# Patient Record
Sex: FEMALE | Race: WHITE | NOT HISPANIC OR LATINO
[De-identification: names, ages, dates, MRNs, and addresses within clinical notes are randomized per-mention and may not be internally consistent; named-entity substitution may affect disease eponyms.]

---

## 2017-01-27 ENCOUNTER — APPOINTMENT (OUTPATIENT)
Dept: OBGYN | Facility: CLINIC | Age: 47
End: 2017-01-27

## 2017-01-31 ENCOUNTER — APPOINTMENT (OUTPATIENT)
Dept: ULTRASOUND IMAGING | Facility: CLINIC | Age: 47
End: 2017-01-31

## 2017-06-23 ENCOUNTER — APPOINTMENT (OUTPATIENT)
Dept: OBGYN | Facility: CLINIC | Age: 47
End: 2017-06-23

## 2017-09-11 ENCOUNTER — APPOINTMENT (OUTPATIENT)
Dept: OBGYN | Facility: CLINIC | Age: 47
End: 2017-09-11

## 2017-09-13 ENCOUNTER — RESULT REVIEW (OUTPATIENT)
Age: 47
End: 2017-09-13

## 2017-09-14 ENCOUNTER — APPOINTMENT (OUTPATIENT)
Dept: OBGYN | Facility: CLINIC | Age: 47
End: 2017-09-14
Payer: COMMERCIAL

## 2017-09-14 PROCEDURE — 36415 COLL VENOUS BLD VENIPUNCTURE: CPT

## 2017-09-14 PROCEDURE — 99396 PREV VISIT EST AGE 40-64: CPT

## 2018-12-09 ENCOUNTER — RESULT REVIEW (OUTPATIENT)
Age: 48
End: 2018-12-09

## 2018-12-10 ENCOUNTER — APPOINTMENT (OUTPATIENT)
Dept: OBGYN | Facility: CLINIC | Age: 48
End: 2018-12-10
Payer: COMMERCIAL

## 2018-12-10 PROCEDURE — 99396 PREV VISIT EST AGE 40-64: CPT

## 2019-09-16 ENCOUNTER — APPOINTMENT (OUTPATIENT)
Dept: OBGYN | Facility: CLINIC | Age: 49
End: 2019-09-16
Payer: COMMERCIAL

## 2019-09-16 PROCEDURE — 99213 OFFICE O/P EST LOW 20 MIN: CPT

## 2019-12-15 ENCOUNTER — FORM ENCOUNTER (OUTPATIENT)
Age: 49
End: 2019-12-15

## 2019-12-16 ENCOUNTER — APPOINTMENT (OUTPATIENT)
Dept: OBGYN | Facility: CLINIC | Age: 49
End: 2019-12-16
Payer: COMMERCIAL

## 2019-12-16 ENCOUNTER — RESULT REVIEW (OUTPATIENT)
Age: 49
End: 2019-12-16

## 2019-12-16 PROCEDURE — 99396 PREV VISIT EST AGE 40-64: CPT

## 2019-12-31 ENCOUNTER — APPOINTMENT (OUTPATIENT)
Dept: MAMMOGRAPHY | Facility: IMAGING CENTER | Age: 49
End: 2019-12-31
Payer: COMMERCIAL

## 2019-12-31 ENCOUNTER — OUTPATIENT (OUTPATIENT)
Dept: OUTPATIENT SERVICES | Facility: HOSPITAL | Age: 49
LOS: 1 days | End: 2019-12-31
Payer: COMMERCIAL

## 2019-12-31 ENCOUNTER — APPOINTMENT (OUTPATIENT)
Dept: ULTRASOUND IMAGING | Facility: IMAGING CENTER | Age: 49
End: 2019-12-31
Payer: COMMERCIAL

## 2019-12-31 DIAGNOSIS — Z00.8 ENCOUNTER FOR OTHER GENERAL EXAMINATION: ICD-10-CM

## 2019-12-31 PROCEDURE — 77063 BREAST TOMOSYNTHESIS BI: CPT

## 2019-12-31 PROCEDURE — 77067 SCR MAMMO BI INCL CAD: CPT | Mod: 26

## 2019-12-31 PROCEDURE — 76641 ULTRASOUND BREAST COMPLETE: CPT | Mod: 26,50

## 2019-12-31 PROCEDURE — 77067 SCR MAMMO BI INCL CAD: CPT

## 2019-12-31 PROCEDURE — 76641 ULTRASOUND BREAST COMPLETE: CPT

## 2019-12-31 PROCEDURE — 77063 BREAST TOMOSYNTHESIS BI: CPT | Mod: 26

## 2020-12-10 ENCOUNTER — RESULT REVIEW (OUTPATIENT)
Age: 50
End: 2020-12-10

## 2020-12-10 ENCOUNTER — APPOINTMENT (OUTPATIENT)
Dept: OBGYN | Facility: CLINIC | Age: 50
End: 2020-12-10
Payer: COMMERCIAL

## 2020-12-10 PROCEDURE — 36415 COLL VENOUS BLD VENIPUNCTURE: CPT

## 2020-12-10 PROCEDURE — 99072 ADDL SUPL MATRL&STAF TM PHE: CPT

## 2020-12-10 PROCEDURE — 58100 BIOPSY OF UTERUS LINING: CPT

## 2020-12-31 ENCOUNTER — FORM ENCOUNTER (OUTPATIENT)
Age: 50
End: 2020-12-31

## 2021-01-06 ENCOUNTER — APPOINTMENT (OUTPATIENT)
Dept: OBGYN | Facility: CLINIC | Age: 51
End: 2021-01-06

## 2021-01-15 ENCOUNTER — APPOINTMENT (OUTPATIENT)
Dept: MAMMOGRAPHY | Facility: IMAGING CENTER | Age: 51
End: 2021-01-15

## 2021-01-18 ENCOUNTER — APPOINTMENT (OUTPATIENT)
Dept: MAMMOGRAPHY | Facility: IMAGING CENTER | Age: 51
End: 2021-01-18
Payer: COMMERCIAL

## 2021-01-18 ENCOUNTER — RESULT REVIEW (OUTPATIENT)
Age: 51
End: 2021-01-18

## 2021-01-18 ENCOUNTER — OUTPATIENT (OUTPATIENT)
Dept: OUTPATIENT SERVICES | Facility: HOSPITAL | Age: 51
LOS: 1 days | End: 2021-01-18
Payer: COMMERCIAL

## 2021-01-18 DIAGNOSIS — Z12.39 ENCOUNTER FOR OTHER SCREENING FOR MALIGNANT NEOPLASM OF BREAST: ICD-10-CM

## 2021-01-18 DIAGNOSIS — Z00.8 ENCOUNTER FOR OTHER GENERAL EXAMINATION: ICD-10-CM

## 2021-01-18 PROCEDURE — 77067 SCR MAMMO BI INCL CAD: CPT | Mod: 26

## 2021-01-18 PROCEDURE — 77063 BREAST TOMOSYNTHESIS BI: CPT | Mod: 26

## 2021-01-18 PROCEDURE — 77063 BREAST TOMOSYNTHESIS BI: CPT

## 2021-01-18 PROCEDURE — 77067 SCR MAMMO BI INCL CAD: CPT

## 2021-01-19 ENCOUNTER — NON-APPOINTMENT (OUTPATIENT)
Age: 51
End: 2021-01-19

## 2021-01-19 ENCOUNTER — APPOINTMENT (OUTPATIENT)
Dept: CARDIOLOGY | Facility: CLINIC | Age: 51
End: 2021-01-19
Payer: COMMERCIAL

## 2021-01-19 VITALS
SYSTOLIC BLOOD PRESSURE: 126 MMHG | RESPIRATION RATE: 15 BRPM | TEMPERATURE: 98.3 F | WEIGHT: 148 LBS | DIASTOLIC BLOOD PRESSURE: 82 MMHG | HEIGHT: 60 IN | BODY MASS INDEX: 29.06 KG/M2 | HEART RATE: 70 BPM

## 2021-01-19 PROCEDURE — 99072 ADDL SUPL MATRL&STAF TM PHE: CPT

## 2021-01-19 PROCEDURE — 99203 OFFICE O/P NEW LOW 30 MIN: CPT

## 2021-01-19 PROCEDURE — 93000 ELECTROCARDIOGRAM COMPLETE: CPT

## 2021-01-27 ENCOUNTER — NON-APPOINTMENT (OUTPATIENT)
Age: 51
End: 2021-01-27

## 2021-01-27 ENCOUNTER — APPOINTMENT (OUTPATIENT)
Dept: CARDIOLOGY | Facility: CLINIC | Age: 51
End: 2021-01-27
Payer: SELF-PAY

## 2021-01-27 PROCEDURE — 97802 MEDICAL NUTRITION INDIV IN: CPT

## 2021-01-29 ENCOUNTER — NON-APPOINTMENT (OUTPATIENT)
Age: 51
End: 2021-01-29

## 2021-02-01 ENCOUNTER — RESULT REVIEW (OUTPATIENT)
Age: 51
End: 2021-02-01

## 2021-02-02 ENCOUNTER — APPOINTMENT (OUTPATIENT)
Dept: OBGYN | Facility: CLINIC | Age: 51
End: 2021-02-02

## 2021-02-02 ENCOUNTER — APPOINTMENT (OUTPATIENT)
Dept: OBGYN | Facility: CLINIC | Age: 51
End: 2021-02-02
Payer: COMMERCIAL

## 2021-02-02 ENCOUNTER — FORM ENCOUNTER (OUTPATIENT)
Age: 51
End: 2021-02-02

## 2021-02-02 ENCOUNTER — NON-APPOINTMENT (OUTPATIENT)
Age: 51
End: 2021-02-02

## 2021-02-02 PROCEDURE — 99072 ADDL SUPL MATRL&STAF TM PHE: CPT

## 2021-02-02 PROCEDURE — 99396 PREV VISIT EST AGE 40-64: CPT

## 2021-02-10 ENCOUNTER — APPOINTMENT (OUTPATIENT)
Dept: OBGYN | Facility: CLINIC | Age: 51
End: 2021-02-10

## 2021-02-10 ENCOUNTER — NON-APPOINTMENT (OUTPATIENT)
Age: 51
End: 2021-02-10

## 2021-02-11 ENCOUNTER — APPOINTMENT (OUTPATIENT)
Dept: OPHTHALMOLOGY | Facility: CLINIC | Age: 51
End: 2021-02-11
Payer: COMMERCIAL

## 2021-02-11 ENCOUNTER — NON-APPOINTMENT (OUTPATIENT)
Age: 51
End: 2021-02-11

## 2021-02-11 PROCEDURE — 99072 ADDL SUPL MATRL&STAF TM PHE: CPT

## 2021-02-11 PROCEDURE — 92133 CPTRZD OPH DX IMG PST SGM ON: CPT

## 2021-02-11 PROCEDURE — 92015 DETERMINE REFRACTIVE STATE: CPT

## 2021-02-11 PROCEDURE — 92004 COMPRE OPH EXAM NEW PT 1/>: CPT

## 2021-02-17 ENCOUNTER — FORM ENCOUNTER (OUTPATIENT)
Age: 51
End: 2021-02-17

## 2021-02-22 ENCOUNTER — APPOINTMENT (OUTPATIENT)
Dept: CARDIOLOGY | Facility: CLINIC | Age: 51
End: 2021-02-22
Payer: COMMERCIAL

## 2021-02-22 VITALS
RESPIRATION RATE: 17 BRPM | HEIGHT: 60 IN | DIASTOLIC BLOOD PRESSURE: 80 MMHG | SYSTOLIC BLOOD PRESSURE: 124 MMHG | HEART RATE: 72 BPM | BODY MASS INDEX: 29.06 KG/M2 | TEMPERATURE: 98 F | WEIGHT: 148 LBS

## 2021-02-22 PROCEDURE — 93015 CV STRESS TEST SUPVJ I&R: CPT

## 2021-02-22 PROCEDURE — 93306 TTE W/DOPPLER COMPLETE: CPT

## 2021-02-22 PROCEDURE — 99072 ADDL SUPL MATRL&STAF TM PHE: CPT

## 2021-02-22 PROCEDURE — 99213 OFFICE O/P EST LOW 20 MIN: CPT | Mod: 25

## 2021-03-10 ENCOUNTER — APPOINTMENT (OUTPATIENT)
Dept: OPHTHALMOLOGY | Facility: CLINIC | Age: 51
End: 2021-03-10

## 2021-03-17 ENCOUNTER — APPOINTMENT (OUTPATIENT)
Dept: OBGYN | Facility: CLINIC | Age: 51
End: 2021-03-17

## 2021-03-24 ENCOUNTER — APPOINTMENT (OUTPATIENT)
Dept: OBGYN | Facility: CLINIC | Age: 51
End: 2021-03-24

## 2021-04-07 ENCOUNTER — APPOINTMENT (OUTPATIENT)
Dept: OBGYN | Facility: CLINIC | Age: 51
End: 2021-04-07

## 2021-04-20 ENCOUNTER — FORM ENCOUNTER (OUTPATIENT)
Age: 51
End: 2021-04-20

## 2021-04-21 ENCOUNTER — APPOINTMENT (OUTPATIENT)
Dept: OBGYN | Facility: CLINIC | Age: 51
End: 2021-04-21
Payer: COMMERCIAL

## 2021-04-21 PROCEDURE — 76831 ECHO EXAM UTERUS: CPT

## 2021-04-21 PROCEDURE — 99072 ADDL SUPL MATRL&STAF TM PHE: CPT

## 2021-04-21 PROCEDURE — 58340 CATHETER FOR HYSTEROGRAPHY: CPT

## 2021-04-22 ENCOUNTER — FORM ENCOUNTER (OUTPATIENT)
Age: 51
End: 2021-04-22

## 2021-04-29 ENCOUNTER — LABORATORY RESULT (OUTPATIENT)
Age: 51
End: 2021-04-29

## 2021-05-03 ENCOUNTER — NON-APPOINTMENT (OUTPATIENT)
Age: 51
End: 2021-05-03

## 2021-05-03 ENCOUNTER — APPOINTMENT (OUTPATIENT)
Dept: CARDIOLOGY | Facility: CLINIC | Age: 51
End: 2021-05-03
Payer: COMMERCIAL

## 2021-05-03 VITALS
HEART RATE: 98 BPM | TEMPERATURE: 98.2 F | BODY MASS INDEX: 29.06 KG/M2 | HEIGHT: 60 IN | SYSTOLIC BLOOD PRESSURE: 160 MMHG | WEIGHT: 148 LBS | RESPIRATION RATE: 18 BRPM | OXYGEN SATURATION: 91 % | DIASTOLIC BLOOD PRESSURE: 90 MMHG

## 2021-05-03 PROCEDURE — 99214 OFFICE O/P EST MOD 30 MIN: CPT | Mod: 25

## 2021-05-03 PROCEDURE — 99072 ADDL SUPL MATRL&STAF TM PHE: CPT

## 2021-05-03 PROCEDURE — 93000 ELECTROCARDIOGRAM COMPLETE: CPT

## 2021-05-03 RX ORDER — DEXAMETHASONE 2 MG/1
2 TABLET ORAL
Qty: 27 | Refills: 0 | Status: DISCONTINUED | COMMUNITY
Start: 2021-04-06

## 2021-05-03 RX ORDER — BUDESONIDE 0.5 MG/2ML
0.5 INHALANT ORAL
Qty: 360 | Refills: 0 | Status: DISCONTINUED | COMMUNITY
Start: 2021-04-28

## 2021-05-03 RX ORDER — FAMOTIDINE 20 MG/1
20 TABLET, FILM COATED ORAL
Qty: 28 | Refills: 0 | Status: DISCONTINUED | COMMUNITY
Start: 2021-04-06

## 2021-05-03 RX ORDER — CHOLECALCIFEROL (VITAMIN D3) 125 MCG
125 MCG TABLET ORAL
Qty: 30 | Refills: 0 | Status: DISCONTINUED | COMMUNITY
Start: 2021-04-06

## 2021-05-03 NOTE — DISCUSSION/SUMMARY
[FreeTextEntry1] : Dr. Archer-(PRIOR VISIT and PMH WITH Dr. Archer): \par This is a 50-year-old female with past medical history significant for hypercholesterolemia, who comes in for cardiac follow-up evaluation.  She denies chest pain, shortness of breath, dizziness or syncope.   \par She does get occasional dyspnea on exertion.  She has no history of rheumatic fever.  She does not drink excessive caffeine or alcohol.\par \par The patient had normal exercise stress test February 22, 2021.\par \par The patient has an elevated high-sensitivity C-reactive protein and elevated direct LDL cholesterol of 167 mg/dL.\par \par Given this patient's risk profile, I recommended starting her on Crestor 20 mg daily for primary prevention.  She did not wish to start the medication, and was concerned because her sister was started on 10 mg dose.  This patient is similar to a patient studied in the Jagdeep trial and would benefit from lipid-lowering therapy.\par \par She will now start the appropriate 20 mg dose of Crestor and have new blood work in 1 month.\par She will also have an echo Doppler examination to rule out mitral valve prolapse, evaluate her left ventricular function, chamber size, and rule out hypertrophy.\par \par Electrocardiogram done January 19, 2021 demonstrates normal sinus rhythm rate 71 bpm is otherwise remarkable for nonspecific ST wave changes.\par \par The patient had blood work done January 5, 2021 which demonstrated a potassium of 4.0, total cholesterol of 248, HDL of 46, triglycerides of 215, calculated LDL cholesterol 164 mg/dL, non-HDL cholesterol 202 mg/dL.  Hemoglobin A1c was 4.7.\par \par Her cardiac risk factors include hypercholesterolemia, and positive family history of heart disease (her father had coronary artery disease and ultimately had a CABG in his 70s, and mother had hypercholesterolemia).  She has 4 children including a set of boy twins, and 1 boy had a hypoplastic heart syndrome and had 3 surgeries at [facility].\par \par The patient may also benefit from coronary artery calcium score to determine her enhanced risk.\par \par The patient understands that aerobic exercises must be increased to 40 minutes 4 times per week. A detailed discussion of lifestyle modification was done today. The patient has a good understanding of the diagnosis, and treatment plan. Lifestyle modification was also outlined.

## 2021-05-03 NOTE — ASSESSMENT
[FreeTextEntry1] : This is a 50 year year old female here today for follow up cardiac evaluation. \par She has a past medical history significant for  hypercholesterolemia, COVID 19 March 2021.\par \par Today she is feeling generally well and does not have any complaints at this time. She would like to make a note that she recently recovered from COVID 19 this past March 2021 and since then she has felt more weak and fatigued. She denies chest pain, heart palpitations or difficulty breathing at this time. She is on Rosuvastatin 20mg and tolerating well. She had recent blood work done 4/29/2021 which demonstrated an improvement in her cholesterol levels. She still does have slight leg pain but she has this before she started statin therapy and thinks it could be related to her back pain. She does at times have "pins and needles" in her feet. \par \par -Her cardiac risk factors include hypercholesterolemia, and positive family history of heart disease (her father had coronary artery disease and ultimately had a CABG in his 70s, and mother had hypercholesterolemia).  She has 4 children including a set of boy twins, and 1 boy had a hypoplastic heart syndrome and had 3 surgeries at [facility].\par \par BLOOD PRESSURE:\par -BP is elevated on today's exam. This is her 1st elevated reading since her COVID infection. I explained that we will watch this reading but I would like her back in 1 month to see if her BP remains elevated. If it is I will consider placing her on Telmisartan 80mg PO DAILY to start. She explained that her BP is "never this high".\par \par -I have discussed the importance of maintaining good BP control and reviewed the newest guidelines with the patient while re-enforcing dietary sodium restrictions to no more than 2-3 g daily, DASH diet, life style modifications as well as the goal of maintaining ideal body weight with the patient today. I have advised the patient to avoid the use of over-the-counter medications/ supplements especially NSAIDS.\par \par BLOOD WORK:\par -New blood work was done 4/29/2021 which demonstrated cholesterol of 191 and LDL of 108.\par -She remains on Rosuvastatin 20mg PO DAILY.\par \par CHOLESTEROL CONTROL:\par -Patient will continue the advised  TLC diet and to continue follow-up for treatment of hyperlipidemia and repeat blood testing with diet and exercise. I have discussed different exercises and the importance of maintenance of optimal body weight. The importance of staying within guidelines and recommendations was stressed to the patient today and they acknowledged that they understand this to me verbally.\par \par  -Ms. MILLAN was educated and advised that failure to follow-up with my medical recommendations to lower cholesterol can result in severe health consequences therefore, they will continuing a low saturated and low fat diet and to avoid excessive carbohydrates to help reduce triglycerides and that lowering LDL levels is associated with a significant decrease in serious cardiac events including but not limited to heart attack stroke and overall death. We will continue lipid lowering agents as advised based on blood test results and the patient understands to call if they develop severe muscle discomfort or if they have a reddish tinted discoloration in their urine.\par \par TESTING/REPORTS:\par -EKG done May 03, 2021 which demonstrated regular sinus rhythm with nonspecific ST-T wave changes, BPM of 75.\par \par -The patient had an echocardiogram done on 2/22/2021 demonstrated mild mitral tricuspid and pulmonic regurgitation with normal left ventricular systolic function. \par -EF on echo reported to be 65%.\par -The patient had a normal exercise stress test done on February 22, 2021.\par \par PLAN:\par -She will follow up in 1 month for BP check and if it remains elevated we can consider the addition of Telmisartan 80mg PO DAILY. \par -We will discuss a Ca score which may benefit her to determined her enhanced cardiac risk.\par \par I have discussed the plan of care with Ms. ROBERTO MILLAN  and she  will follow up in 1 month. She is compliant with all of her medications.\par \par The patient understands that aerobic exercises must be increased to minutes 4 times/week and a detailed discussion of lifestyle modification was done today. \par The patient has a good understanding of the diagnosis, treatment plan and lifestyle modification. \par She will contact me at the office for any questions with their care or any changes in their health status.\par \par The plan of care was discussed with supervising physician, Dr. Archer while present in the office at the time of the visit. \par \par Caleb MEHTA

## 2021-05-03 NOTE — PHYSICAL EXAM
[General Appearance - Well Developed] : well developed [Normal Appearance] : normal appearance [Well Groomed] : well groomed [General Appearance - Well Nourished] : well nourished [No Deformities] : no deformities [General Appearance - In No Acute Distress] : no acute distress [Normal Oral Mucosa] : normal oral mucosa [Normal Jugular Venous A Waves Present] : normal jugular venous A waves present [Normal Jugular Venous V Waves Present] : normal jugular venous V waves present [No Jugular Venous Marinelli A Waves] : no jugular venous marinelli A waves [Respiration, Rhythm And Depth] : normal respiratory rhythm and effort [Exaggerated Use Of Accessory Muscles For Inspiration] : no accessory muscle use [Auscultation Breath Sounds / Voice Sounds] : lungs were clear to auscultation bilaterally [Bowel Sounds] : normal bowel sounds [Abdomen Soft] : soft [Abnormal Walk] : normal gait [Gait - Sufficient For Exercise Testing] : the gait was sufficient for exercise testing [Nail Clubbing] : no clubbing of the fingernails [Cyanosis, Localized] : no localized cyanosis [Skin Color & Pigmentation] : normal skin color and pigmentation [Skin Turgor] : normal skin turgor [] : no rash [Oriented To Time, Place, And Person] : oriented to person, place, and time [Affect] : the affect was normal [Mood] : the mood was normal [No Anxiety] : not feeling anxious [5th Left ICS - MCL] : palpated at the 5th LICS in the midclavicular line [Normal] : normal [No Precordial Heave] : no precordial heave was noted [Normal Rate] : normal [Rhythm Regular] : regular [Normal S1] : normal S1 [Normal S2] : normal S2 [II] : a grade 2 [2+] : left 2+ [No Abnormalities] : the abdominal aorta was not enlarged and no bruit was heard [No Pitting Edema] : no pitting edema present [Well Developed] : well developed [Well Nourished] : well nourished [No Acute Distress] : no acute distress [Normal Conjunctiva] : normal conjunctiva [Normal Venous Pressure] : normal venous pressure [No Carotid Bruit] : no carotid bruit [Normal S1, S2] : normal S1, S2 [No Murmur] : no murmur [No Rub] : no rub [No Gallop] : no gallop [Clear Lung Fields] : clear lung fields [Good Air Entry] : good air entry [No Respiratory Distress] : no respiratory distress  [Soft] : abdomen soft [Non Tender] : non-tender [No Masses/organomegaly] : no masses/organomegaly [Normal Bowel Sounds] : normal bowel sounds [Normal Gait] : normal gait [No Edema] : no edema [No Cyanosis] : no cyanosis [No Clubbing] : no clubbing [No Varicosities] : no varicosities [No Rash] : no rash [No Skin Lesions] : no skin lesions [Moves all extremities] : moves all extremities [No Focal Deficits] : no focal deficits [Normal Speech] : normal speech [Alert and Oriented] : alert and oriented [Normal memory] : normal memory [S3] : no S3 [S4] : no S4 [Click] : no click [Pericardial Rub] : no pericardial rub [Right Carotid Bruit] : no bruit heard over the right carotid [Left Carotid Bruit] : no bruit heard over the left carotid [Right Femoral Bruit] : no bruit heard over the right femoral artery [Left Femoral Bruit] : no bruit heard over the left femoral artery

## 2021-05-03 NOTE — REASON FOR VISIT
[Follow-Up - Clinic] : a clinic follow-up of [Dyspnea] : dyspnea [Hypertension] : hypertension [Hyperlipidemia] : hyperlipidemia [FreeTextEntry1] : murmur

## 2021-05-12 ENCOUNTER — TRANSCRIPTION ENCOUNTER (OUTPATIENT)
Age: 51
End: 2021-05-12

## 2021-05-12 ENCOUNTER — APPOINTMENT (OUTPATIENT)
Dept: VASCULAR SURGERY | Facility: CLINIC | Age: 51
End: 2021-05-12
Payer: COMMERCIAL

## 2021-05-12 VITALS
BODY MASS INDEX: 29.06 KG/M2 | WEIGHT: 148 LBS | HEIGHT: 60 IN | SYSTOLIC BLOOD PRESSURE: 143 MMHG | DIASTOLIC BLOOD PRESSURE: 94 MMHG | HEART RATE: 76 BPM | TEMPERATURE: 97.3 F

## 2021-05-12 DIAGNOSIS — M79.605 PAIN IN LEFT LEG: ICD-10-CM

## 2021-05-12 PROCEDURE — 99203 OFFICE O/P NEW LOW 30 MIN: CPT

## 2021-05-12 PROCEDURE — 99072 ADDL SUPL MATRL&STAF TM PHE: CPT

## 2021-05-12 PROCEDURE — 93971 EXTREMITY STUDY: CPT

## 2021-05-12 NOTE — DATA REVIEWED
[FreeTextEntry1] : 5/12/21 Venous Doppler: Left leg negative for DVT/SVT. Negative for insufficiency

## 2021-05-12 NOTE — HISTORY OF PRESENT ILLNESS
[FreeTextEntry1] : 49 y/o f sent here for eval on left leg pain. As per pt she has had intermittent left leg pain for approx 1 year, but since getting covid recently Mar/April the pain is more prominent. She is able to walk 3 miles a day. She says sometimes the pain radiates down the leg and other times it doesn’t. She denies any swelling or history of DVTs. She also reports left intermittent arm/shoulder pain. She is on ASA and Statin.

## 2021-05-12 NOTE — ASSESSMENT
[FreeTextEntry1] : 49 y/o f w/ intermittent left leg pain. No abnormal venous or arterial findings to explain pts symptoms. Her complaints may be more neurologic or orthopedic in nature. \par \par [facility] \par Referral for neurology and ortho\par RTO on an as needed basis

## 2021-05-12 NOTE — PHYSICAL EXAM
[2+] : left 2+ [No Rash or Lesion] : No rash or lesion [Alert] : alert [Oriented to Person] : oriented to person [Oriented to Place] : oriented to place [Oriented to Time] : oriented to time [Calm] : calm [Ankle Swelling (On Exam)] : not present [Varicose Veins Of Lower Extremities] : not present [] : not present [de-identified] : well [FreeTextEntry1] : Bilateral lower extremities w/ warm intact skin. No varicosities. No edema or open wounds [de-identified] : LAKESHIAL

## 2021-05-19 ENCOUNTER — OUTPATIENT (OUTPATIENT)
Dept: OUTPATIENT SERVICES | Facility: HOSPITAL | Age: 51
LOS: 1 days | End: 2021-05-19
Payer: COMMERCIAL

## 2021-05-19 VITALS
WEIGHT: 147.93 LBS | HEART RATE: 76 BPM | RESPIRATION RATE: 16 BRPM | TEMPERATURE: 98 F | SYSTOLIC BLOOD PRESSURE: 145 MMHG | DIASTOLIC BLOOD PRESSURE: 85 MMHG | OXYGEN SATURATION: 98 % | HEIGHT: 60 IN

## 2021-05-19 DIAGNOSIS — Z01.818 ENCOUNTER FOR OTHER PREPROCEDURAL EXAMINATION: ICD-10-CM

## 2021-05-19 DIAGNOSIS — N84.0 POLYP OF CORPUS UTERI: ICD-10-CM

## 2021-05-19 DIAGNOSIS — Z98.890 OTHER SPECIFIED POSTPROCEDURAL STATES: Chronic | ICD-10-CM

## 2021-05-19 LAB
ANION GAP SERPL CALC-SCNC: 12 MMOL/L — SIGNIFICANT CHANGE UP (ref 5–17)
BUN SERPL-MCNC: 12 MG/DL — SIGNIFICANT CHANGE UP (ref 7–23)
CALCIUM SERPL-MCNC: 10.2 MG/DL — SIGNIFICANT CHANGE UP (ref 8.4–10.5)
CHLORIDE SERPL-SCNC: 102 MMOL/L — SIGNIFICANT CHANGE UP (ref 96–108)
CO2 SERPL-SCNC: 25 MMOL/L — SIGNIFICANT CHANGE UP (ref 22–31)
CREAT SERPL-MCNC: 0.62 MG/DL — SIGNIFICANT CHANGE UP (ref 0.5–1.3)
GLUCOSE SERPL-MCNC: 81 MG/DL — SIGNIFICANT CHANGE UP (ref 70–99)
HCT VFR BLD CALC: 34.1 % — LOW (ref 34.5–45)
HGB BLD-MCNC: 10.7 G/DL — LOW (ref 11.5–15.5)
MCHC RBC-ENTMCNC: 25.5 PG — LOW (ref 27–34)
MCHC RBC-ENTMCNC: 31.4 GM/DL — LOW (ref 32–36)
MCV RBC AUTO: 81.2 FL — SIGNIFICANT CHANGE UP (ref 80–100)
NRBC # BLD: 0 /100 WBCS — SIGNIFICANT CHANGE UP (ref 0–0)
PLATELET # BLD AUTO: 347 K/UL — SIGNIFICANT CHANGE UP (ref 150–400)
POTASSIUM SERPL-MCNC: 4.2 MMOL/L — SIGNIFICANT CHANGE UP (ref 3.5–5.3)
POTASSIUM SERPL-SCNC: 4.2 MMOL/L — SIGNIFICANT CHANGE UP (ref 3.5–5.3)
RBC # BLD: 4.2 M/UL — SIGNIFICANT CHANGE UP (ref 3.8–5.2)
RBC # FLD: 15.4 % — HIGH (ref 10.3–14.5)
SODIUM SERPL-SCNC: 139 MMOL/L — SIGNIFICANT CHANGE UP (ref 135–145)
WBC # BLD: 9.17 K/UL — SIGNIFICANT CHANGE UP (ref 3.8–10.5)
WBC # FLD AUTO: 9.17 K/UL — SIGNIFICANT CHANGE UP (ref 3.8–10.5)

## 2021-05-19 PROCEDURE — G0463: CPT

## 2021-05-19 PROCEDURE — 85027 COMPLETE CBC AUTOMATED: CPT

## 2021-05-19 PROCEDURE — 80048 BASIC METABOLIC PNL TOTAL CA: CPT

## 2021-05-19 RX ORDER — LIDOCAINE HCL 20 MG/ML
0.2 VIAL (ML) INJECTION ONCE
Refills: 0 | Status: DISCONTINUED | OUTPATIENT
Start: 2021-06-04 | End: 2021-06-18

## 2021-05-19 RX ORDER — SODIUM CHLORIDE 9 MG/ML
3 INJECTION INTRAMUSCULAR; INTRAVENOUS; SUBCUTANEOUS EVERY 8 HOURS
Refills: 0 | Status: DISCONTINUED | OUTPATIENT
Start: 2021-06-04 | End: 2021-06-18

## 2021-05-19 NOTE — H&P PST ADULT - NSICDXPROBLEM_GEN_ALL_CORE_FT
PROBLEM DIAGNOSES  Problem: Uterine polyp  Assessment and Plan: Pt is scheduled for a D&C, diagnostic hysteroscopy, polypectomy and possible operative hysteroscopy  NPO after 11PM  No meds in AM DOS  Medical clearance with Dr. Pandya pre-op   will drive patient home DOS

## 2021-05-19 NOTE — H&P PST ADULT - ATTENDING COMMENTS
Pt consented for D&C, diagnostic hysteroscopy, polypectomy and possible operative hysteroscopy. All questions answered.

## 2021-05-19 NOTE — H&P PST ADULT - NSICDXPASTMEDICALHX_GEN_ALL_CORE_FT
PAST MEDICAL HISTORY:  2019 novel coronavirus disease (COVID-19) End of March 2021    Hypercholesteremia     Mild anemia     PCOS (polycystic ovarian syndrome)     Uterine polyp

## 2021-05-24 ENCOUNTER — FORM ENCOUNTER (OUTPATIENT)
Age: 51
End: 2021-05-24

## 2021-05-28 PROBLEM — E28.2 POLYCYSTIC OVARIAN SYNDROME: Chronic | Status: ACTIVE | Noted: 2021-05-19

## 2021-05-28 PROBLEM — U07.1 COVID-19: Chronic | Status: ACTIVE | Noted: 2021-05-19

## 2021-05-28 PROBLEM — N84.0 POLYP OF CORPUS UTERI: Chronic | Status: ACTIVE | Noted: 2021-05-19

## 2021-05-28 PROBLEM — E78.00 PURE HYPERCHOLESTEROLEMIA, UNSPECIFIED: Chronic | Status: ACTIVE | Noted: 2021-05-19

## 2021-05-28 PROBLEM — D64.9 ANEMIA, UNSPECIFIED: Chronic | Status: ACTIVE | Noted: 2021-05-19

## 2021-06-01 ENCOUNTER — OUTPATIENT (OUTPATIENT)
Dept: OUTPATIENT SERVICES | Facility: HOSPITAL | Age: 51
LOS: 1 days | End: 2021-06-01
Payer: COMMERCIAL

## 2021-06-01 DIAGNOSIS — Z98.890 OTHER SPECIFIED POSTPROCEDURAL STATES: Chronic | ICD-10-CM

## 2021-06-01 DIAGNOSIS — Z11.52 ENCOUNTER FOR SCREENING FOR COVID-19: ICD-10-CM

## 2021-06-01 LAB — SARS-COV-2 RNA SPEC QL NAA+PROBE: SIGNIFICANT CHANGE UP

## 2021-06-01 PROCEDURE — U0005: CPT

## 2021-06-01 PROCEDURE — C9803: CPT

## 2021-06-01 PROCEDURE — U0003: CPT

## 2021-06-03 ENCOUNTER — TRANSCRIPTION ENCOUNTER (OUTPATIENT)
Age: 51
End: 2021-06-03

## 2021-06-04 ENCOUNTER — APPOINTMENT (OUTPATIENT)
Dept: OBGYN | Facility: HOSPITAL | Age: 51
End: 2021-06-04

## 2021-06-04 ENCOUNTER — RESULT REVIEW (OUTPATIENT)
Age: 51
End: 2021-06-04

## 2021-06-04 ENCOUNTER — OUTPATIENT (OUTPATIENT)
Dept: OUTPATIENT SERVICES | Facility: HOSPITAL | Age: 51
LOS: 1 days | End: 2021-06-04
Payer: COMMERCIAL

## 2021-06-04 VITALS
DIASTOLIC BLOOD PRESSURE: 78 MMHG | HEART RATE: 66 BPM | TEMPERATURE: 98 F | OXYGEN SATURATION: 98 % | SYSTOLIC BLOOD PRESSURE: 124 MMHG | RESPIRATION RATE: 18 BRPM | WEIGHT: 147.93 LBS | HEIGHT: 60 IN

## 2021-06-04 VITALS
DIASTOLIC BLOOD PRESSURE: 62 MMHG | SYSTOLIC BLOOD PRESSURE: 127 MMHG | HEART RATE: 71 BPM | RESPIRATION RATE: 12 BRPM | TEMPERATURE: 97 F | OXYGEN SATURATION: 99 %

## 2021-06-04 DIAGNOSIS — N84.0 POLYP OF CORPUS UTERI: ICD-10-CM

## 2021-06-04 DIAGNOSIS — Z98.890 OTHER SPECIFIED POSTPROCEDURAL STATES: Chronic | ICD-10-CM

## 2021-06-04 PROCEDURE — 58558 HYSTEROSCOPY BIOPSY: CPT

## 2021-06-04 PROCEDURE — 88305 TISSUE EXAM BY PATHOLOGIST: CPT

## 2021-06-04 PROCEDURE — 88305 TISSUE EXAM BY PATHOLOGIST: CPT | Mod: 26

## 2021-06-04 RX ORDER — SODIUM CHLORIDE 9 MG/ML
1000 INJECTION, SOLUTION INTRAVENOUS
Refills: 0 | Status: DISCONTINUED | OUTPATIENT
Start: 2021-06-04 | End: 2021-06-18

## 2021-06-04 RX ORDER — ASCORBIC ACID 60 MG
1 TABLET,CHEWABLE ORAL
Qty: 0 | Refills: 0 | DISCHARGE

## 2021-06-04 RX ORDER — ROSUVASTATIN CALCIUM 5 MG/1
1 TABLET ORAL
Qty: 0 | Refills: 0 | DISCHARGE

## 2021-06-04 RX ORDER — ZINC SULFATE TAB 220 MG (50 MG ZINC EQUIVALENT) 220 (50 ZN) MG
1 TAB ORAL
Qty: 0 | Refills: 0 | DISCHARGE

## 2021-06-04 RX ORDER — CHOLECALCIFEROL (VITAMIN D3) 125 MCG
0 CAPSULE ORAL
Qty: 0 | Refills: 0 | DISCHARGE

## 2021-06-04 RX ORDER — ONDANSETRON 8 MG/1
4 TABLET, FILM COATED ORAL ONCE
Refills: 0 | Status: DISCONTINUED | OUTPATIENT
Start: 2021-06-04 | End: 2021-06-18

## 2021-06-04 RX ORDER — BENZOYL PEROXIDE MICRONIZED 5.8 %
1 TOWELETTE (EA) TOPICAL
Qty: 0 | Refills: 0 | DISCHARGE

## 2021-06-04 RX ORDER — ASPIRIN/CALCIUM CARB/MAGNESIUM 324 MG
1 TABLET ORAL
Qty: 0 | Refills: 0 | DISCHARGE

## 2021-06-04 RX ORDER — B-COMPLEX WITH VITAMIN C
0 TABLET ORAL
Qty: 0 | Refills: 0 | DISCHARGE

## 2021-06-04 RX ORDER — FENTANYL CITRATE 50 UG/ML
25 INJECTION INTRAVENOUS
Refills: 0 | Status: DISCONTINUED | OUTPATIENT
Start: 2021-06-04 | End: 2021-06-04

## 2021-06-04 NOTE — ASU PATIENT PROFILE, ADULT - PMH
2019 novel coronavirus disease (COVID-19)  End of March 2021  Hypercholesteremia    Mild anemia    PCOS (polycystic ovarian syndrome)    Uterine polyp

## 2021-06-04 NOTE — ASU DISCHARGE PLAN (ADULT/PEDIATRIC) - ACTIVITY LEVEL
until cleared by your doctor/No excercise/No sports/gym/Nothing per vagina/No tub baths/No douching/No tampons/No intercourse

## 2021-06-04 NOTE — BRIEF OPERATIVE NOTE - NSICDXBRIEFPROCEDURE_GEN_ALL_CORE_FT
PROCEDURES:  Hysteroscopy, with dilation and curettage of uterus, with polypectomy or myomectomy 04-Jun-2021 08:01:23  Tram Thayer

## 2021-06-04 NOTE — ASU DISCHARGE PLAN (ADULT/PEDIATRIC) - CARE PROVIDER_API CALL
Daniel Dill)  Obstetrics and Gynecology  [address] [place] [address], [address]  [place] [address]  Phone: [phone]  Fax: [phone]  Follow Up Time:

## 2021-06-04 NOTE — BRIEF OPERATIVE NOTE - OPERATION/FINDINGS
EUA revealed mobile 7 week uterus, cervix dilated 0.5cm. Hysteroscopy revealed multiple small areas of polpypoid tissue, no large distinct polyp seen. Ostia visualized wnl b/l.

## 2021-06-08 ENCOUNTER — FORM ENCOUNTER (OUTPATIENT)
Age: 51
End: 2021-06-08

## 2021-06-09 ENCOUNTER — APPOINTMENT (OUTPATIENT)
Dept: CARDIOLOGY | Facility: CLINIC | Age: 51
End: 2021-06-09

## 2021-06-09 LAB — SURGICAL PATHOLOGY STUDY: SIGNIFICANT CHANGE UP

## 2021-06-10 ENCOUNTER — FORM ENCOUNTER (OUTPATIENT)
Age: 51
End: 2021-06-10

## 2021-06-16 ENCOUNTER — FORM ENCOUNTER (OUTPATIENT)
Age: 51
End: 2021-06-16

## 2021-06-20 ENCOUNTER — FORM ENCOUNTER (OUTPATIENT)
Age: 51
End: 2021-06-20

## 2021-06-21 ENCOUNTER — APPOINTMENT (OUTPATIENT)
Dept: OBGYN | Facility: CLINIC | Age: 51
End: 2021-06-21
Payer: COMMERCIAL

## 2021-06-21 PROCEDURE — 99212 OFFICE O/P EST SF 10 MIN: CPT

## 2021-06-21 PROCEDURE — 99072 ADDL SUPL MATRL&STAF TM PHE: CPT

## 2021-07-07 ENCOUNTER — APPOINTMENT (OUTPATIENT)
Dept: ORTHOPEDIC SURGERY | Facility: CLINIC | Age: 51
End: 2021-07-07
Payer: COMMERCIAL

## 2021-07-07 ENCOUNTER — APPOINTMENT (OUTPATIENT)
Dept: CARDIOLOGY | Facility: CLINIC | Age: 51
End: 2021-07-07
Payer: COMMERCIAL

## 2021-07-07 VITALS
DIASTOLIC BLOOD PRESSURE: 75 MMHG | WEIGHT: 146 LBS | HEART RATE: 89 BPM | HEIGHT: 60 IN | SYSTOLIC BLOOD PRESSURE: 123 MMHG | BODY MASS INDEX: 28.66 KG/M2

## 2021-07-07 VITALS
RESPIRATION RATE: 18 BRPM | TEMPERATURE: 98.3 F | DIASTOLIC BLOOD PRESSURE: 84 MMHG | BODY MASS INDEX: 28.66 KG/M2 | SYSTOLIC BLOOD PRESSURE: 135 MMHG | WEIGHT: 146 LBS | HEART RATE: 75 BPM | HEIGHT: 60 IN | OXYGEN SATURATION: 98 %

## 2021-07-07 DIAGNOSIS — S80.02XA CONTUSION OF LEFT KNEE, INITIAL ENCOUNTER: ICD-10-CM

## 2021-07-07 DIAGNOSIS — S60.222A CONTUSION OF LEFT HAND, INITIAL ENCOUNTER: ICD-10-CM

## 2021-07-07 PROCEDURE — 99072 ADDL SUPL MATRL&STAF TM PHE: CPT

## 2021-07-07 PROCEDURE — 73110 X-RAY EXAM OF WRIST: CPT | Mod: LT

## 2021-07-07 PROCEDURE — 73562 X-RAY EXAM OF KNEE 3: CPT | Mod: LT

## 2021-07-07 PROCEDURE — 99214 OFFICE O/P EST MOD 30 MIN: CPT

## 2021-07-07 PROCEDURE — 99203 OFFICE O/P NEW LOW 30 MIN: CPT

## 2021-07-07 NOTE — PHYSICAL EXAM
[de-identified] : Constitutional: Well-nourished, well-developed, No acute distress\par Respiratory:  Good respiratory effort, no SOB\par Lymphatic: No regional lymphadenopathy, no lymphedema\par Psychiatric: Pleasant and normal affect, alert and oriented x3\par Musculoskeletal: normal except where as noted in regional exam\par \par Left wrist:\par APPEARANCE: Mild abrasion over the thenar and hypothenar eminence, mild thenar eminence swelling, no ecchymosis, no marked deformities or malalignment\par POSITIVE TENDERNESS: Thenar eminence, volar carpometacarpal joints\par NONTENDER: snuffbox, scapholunate, DRUJ, TFCC, FCU/FCR, ECU/ECRL/ECRB, radial/ulnar styloid, CMC, and MCP joints.\par ROM: full with mild pain at end range of flexion and extension\par RESISTIVE TESTING: Mildly painful 4/5 resisted wrist flexion/extension, pronation/supination and radial/ulnar deviation.  Mildly painful 4/5 long finger and thumb flexion, painless extension.\par SPECIAL TESTS: neg Finkelstein's. neg Phalen's. neg reverse Phalen's. neg Martinez's. neg hudson test. neg TFCC grind. neg tinel's at the carpal tunnel\par Vasc: 2+ radial pulse\par Neuro: AIN, PIN, Ulnar nerve intact to motor\par Sensation: Intact to light touch throughout\par \par Left knee:\par APPEARANCE: Mild abrasion over anterior knee, no ecchymosis, no marked deformities, no swelling or malalignment\par POSITIVE TENDERNESS:  + crepitus of the anterior knee, and tenderness of patellar retinaculum\par NONTENDER: jt lines b/l, patellar & quadriceps tendons, MCL/LCL, ITB at the lateral femoral condyle & Gerdy's tubercle, pes bursa. \par ROM: full & painless, although some discomfort in deep knee flexion\par RESISTIVE TESTING: + discomfort with knee ext from deep knee flexion (stretched position), painless knee flexion. \par SPECIAL TESTS: stable v/v stress. painless grind. neg Lachman's. neg ant/post drawer. neg Alexandre's. \par  [de-identified] : \par The following radiographs were ordered and read by me during this patient's visit. I reviewed each radiograph in detail with the patient and discussed the findings as highlighted below. \par \par 3 views of the left knee were obtained today that show no fracture, or dislocation. There are no degenerative changes seen. There is no malalignment. No obvious osseous abnormality. Otherwise unremarkable.\par \par 3 views of the left wrist were obtained today that show no fracture, or dislocation. There are no degenerative changes seen. There is no malalignment. No obvious osseous abnormality. Otherwise unremarkable.

## 2021-07-07 NOTE — PHYSICAL EXAM
[Well Developed] : well developed [Well Nourished] : well nourished [No Acute Distress] : no acute distress [Normal Venous Pressure] : normal venous pressure [No Carotid Bruit] : no carotid bruit [Normal S1, S2] : normal S1, S2 [No Murmur] : no murmur [No Rub] : no rub [Clear Lung Fields] : clear lung fields [Good Air Entry] : good air entry [No Respiratory Distress] : no respiratory distress  [Soft] : abdomen soft [Non Tender] : non-tender [No Masses/organomegaly] : no masses/organomegaly [Normal Bowel Sounds] : normal bowel sounds [Normal Gait] : normal gait [No Edema] : no edema [No Cyanosis] : no cyanosis [No Clubbing] : no clubbing [No Varicosities] : no varicosities [No Rash] : no rash [No Skin Lesions] : no skin lesions [Moves all extremities] : moves all extremities [No Focal Deficits] : no focal deficits [Normal Speech] : normal speech [Alert and Oriented] : alert and oriented [Normal memory] : normal memory [General Appearance - Well Developed] : well developed [Normal Appearance] : normal appearance [Well Groomed] : well groomed [General Appearance - Well Nourished] : well nourished [No Deformities] : no deformities [General Appearance - In No Acute Distress] : no acute distress [Normal Conjunctiva] : the conjunctiva exhibited no abnormalities [Normal Oral Mucosa] : normal oral mucosa [Normal Jugular Venous A Waves Present] : normal jugular venous A waves present [Normal Jugular Venous V Waves Present] : normal jugular venous V waves present [No Jugular Venous Marinelli A Waves] : no jugular venous marinelli A waves [Respiration, Rhythm And Depth] : normal respiratory rhythm and effort [Exaggerated Use Of Accessory Muscles For Inspiration] : no accessory muscle use [Auscultation Breath Sounds / Voice Sounds] : lungs were clear to auscultation bilaterally [Bowel Sounds] : normal bowel sounds [Abdomen Soft] : soft [Abnormal Walk] : normal gait [Gait - Sufficient For Exercise Testing] : the gait was sufficient for exercise testing [Nail Clubbing] : no clubbing of the fingernails [Cyanosis, Localized] : no localized cyanosis [Skin Color & Pigmentation] : normal skin color and pigmentation [Skin Turgor] : normal skin turgor [] : no rash [Oriented To Time, Place, And Person] : oriented to person, place, and time [Affect] : the affect was normal [Mood] : the mood was normal [No Anxiety] : not feeling anxious [5th Left ICS - MCL] : palpated at the 5th LICS in the midclavicular line [Normal] : normal [No Precordial Heave] : no precordial heave was noted [Normal Rate] : normal [Rhythm Regular] : regular [Normal S1] : normal S1 [Normal S2] : normal S2 [No Gallop] : no gallop heard [II] : a grade 2 [2+] : left 2+ [No Abnormalities] : the abdominal aorta was not enlarged and no bruit was heard [No Pitting Edema] : no pitting edema present [S3] : no S3 [S4] : no S4 [Click] : no click [Pericardial Rub] : no pericardial rub [Right Carotid Bruit] : no bruit heard over the right carotid [Left Carotid Bruit] : no bruit heard over the left carotid [Right Femoral Bruit] : no bruit heard over the right femoral artery [Left Femoral Bruit] : no bruit heard over the left femoral artery

## 2021-07-07 NOTE — HISTORY OF PRESENT ILLNESS
[de-identified] : RHD Patient is here for left hand/knee pain that began when she fell while walking today. She was accommodated with a same day appointment. She has done nothing to treat her pains. 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 \par \par The patient's past medical history, past surgical history, medications and allergies were reviewed by me today and documented accordingly. In addition, the patient's family and social history, which were noncontributory to this visit, were reviewed also. Intake form was reviewed. The patient has no family history of arthritis.

## 2021-07-07 NOTE — DISCUSSION/SUMMARY
[FreeTextEntry1] : Dr. Archer-(PRIOR VISIT and PMH WITH Dr. Archer): \par This is a 50-year-old female with past medical history significant for hypercholesterolemia, who comes in for cardiac follow-up evaluation.  She denies chest pain, shortness of breath, dizziness or syncope.   \par She does get occasional dyspnea on exertion.  She has no history of rheumatic fever.  She does not drink excessive caffeine or alcohol.\par \par The patient had normal exercise stress test February 22, 2021.\par \par The patient has an elevated high-sensitivity C-reactive protein and elevated direct LDL cholesterol of 167 mg/dL.\par \par Given this patient's risk profile, I recommended starting her on Crestor 20 mg daily for primary prevention.  She did not wish to start the medication, and was concerned because her sister was started on 10 mg dose.  This patient is similar to a patient studied in the Jagdeep trial and would benefit from lipid-lowering therapy.\par \par She will now start the appropriate 20 mg dose of Crestor and have new blood work in 1 month.\par She will also have an echo Doppler examination to rule out mitral valve prolapse, evaluate her left ventricular function, chamber size, and rule out hypertrophy.\par \par Electrocardiogram done January 19, 2021 demonstrates normal sinus rhythm rate 71 bpm is otherwise remarkable for nonspecific ST wave changes.\par \par The patient had blood work done January 5, 2021 which demonstrated a potassium of 4.0, total cholesterol of 248, HDL of 46, triglycerides of 215, calculated LDL cholesterol 164 mg/dL, non-HDL cholesterol 202 mg/dL.  Hemoglobin A1c was 4.7.\par \par Her cardiac risk factors include hypercholesterolemia, and positive family history of heart disease (her father had coronary artery disease and ultimately had a CABG in his 70s, and mother had hypercholesterolemia).  She has 4 children including a set of boy twins, and 1 boy had a hypoplastic heart syndrome and had 3 surgeries at [facility].\par \par The patient may also benefit from coronary artery calcium score to determine her enhanced risk.\par \par The patient understands that aerobic exercises must be increased to 40 minutes 4 times per week. A detailed discussion of lifestyle modification was done today. The patient has a good understanding of the diagnosis, and treatment plan. Lifestyle modification was also outlined.

## 2021-07-07 NOTE — ASSESSMENT
[FreeTextEntry1] : This is a 50 year year old female here today for follow up cardiac evaluation. \par She has a past medical history significant for  hypercholesterolemia, COVID 19 March 2021.\par \par Today she is feeling generally well but is here for BP check. She states that she feels that her BP still remains elevated.\par \par -Her cardiac risk factors include hypercholesterolemia, and positive family history of heart disease (her father had coronary artery disease and ultimately had a CABG in his 70s, and mother had hypercholesterolemia).  She has 4 children including a set of boy twins, and 1 boy had a hypoplastic heart syndrome and had 3 surgeries at [facility].\par \par BLOOD PRESSURE:\par -BP is better controlled on today's exam and has actually improved since her last visit with our office. She states she has been under stress but "good stress" (daughter possibly getting  engaged etc). I explained that her BP is coming down nicely and that we will watch it for now since it is improving. She does not wish to be on blood pressure medication at this time. \par \par -I have discussed the importance of maintaining good BP control and reviewed the newest guidelines with the patient while re-enforcing dietary sodium restrictions to no more than 2-3 g daily, DASH diet, life style modifications as well as the goal of maintaining ideal body weight with the patient today. I have advised the patient to avoid the use of over-the-counter medications/ supplements especially NSAIDS.\par \par BLOOD WORK:\par -New blood work was done 4/29/2021 which demonstrated cholesterol of 191 and LDL of 108.\par -She remains on Rosuvastatin 20mg PO DAILY.\par \par CHOLESTEROL CONTROL:\par -Patient will continue the advised  TLC diet and to continue follow-up for treatment of hyperlipidemia and repeat blood testing with diet and exercise. I have discussed different exercises and the importance of maintenance of optimal body weight. The importance of staying within guidelines and recommendations was stressed to the patient today and they acknowledged that they understand this to me verbally.\par \par  -Ms. MILLAN was educated and advised that failure to follow-up with my medical recommendations to lower cholesterol can result in severe health consequences therefore, they will continuing a low saturated and low fat diet and to avoid excessive carbohydrates to help reduce triglycerides and that lowering LDL levels is associated with a significant decrease in serious cardiac events including but not limited to heart attack stroke and overall death. We will continue lipid lowering agents as advised based on blood test results and the patient understands to call if they develop severe muscle discomfort or if they have a reddish tinted discoloration in their urine.\par \par TESTING/REPORTS:\par -EKG done May 03, 2021 which demonstrated regular sinus rhythm with nonspecific ST-T wave changes, BPM of 75.\par \par -The patient had an echocardiogram done on 2/22/2021 demonstrated mild mitral tricuspid and pulmonic regurgitation with normal left ventricular systolic function. \par -EF on echo reported to be 65%.\par -The patient had a normal exercise stress test done on February 22, 2021.\par \par PLAN:\par -She will continue with her usual medications and will contact the office if she is having any complaints between now and their next follow up appointment.\par -She will follow up in 3 months\par -We will discuss a Ca score which may benefit her to determined her enhanced cardiac risk.\par \par I have discussed the plan of care with Ms. ROBERTO MILLAN. She is compliant with all of her medications.\par \par The patient understands that aerobic exercises must be increased to minutes 4 times/week and a detailed discussion of lifestyle modification was done today. \par The patient has a good understanding of the diagnosis, treatment plan and lifestyle modification. \par She will contact me at the office for any questions with their care or any changes in their health status.\par \par The plan of care was discussed with supervising physician, Dr. Archer while present in the office at the time of the visit. \par \par Caleb MEHTA

## 2021-07-07 NOTE — REASON FOR VISIT
No [Hypertension] : hypertension [Follow-Up - Clinic] : a clinic follow-up of [Dyspnea] : dyspnea [Hyperlipidemia] : hyperlipidemia [FreeTextEntry1] : murmur

## 2021-07-07 NOTE — DISCUSSION/SUMMARY
[de-identified] : Discussed findings of today's exam and possible causes of patient's pain.  Educated patient on their most probable diagnosis of left hand/wrist pain due to contusion status post fall, and left anterior knee pain due to contusion status post fall.  Reviewed possible courses of treatment, and we collaboratively decided best course of treatment at this time will include conservative management.  Patient fell this morning, she has no evidence of fracture of either the hand and wrist or the knee on x-rays today, patient has no significant abnormal findings on clinical exam.  Patient has a mild abrasion of the anterior knee, and a mild abrasion of the anterior palmar hand.  Recommend watchful waiting of these issues, recommend continued ADLs as tolerated.  Recommend topical heat/ice as needed, recommend over-the-counter NSAIDs as needed for pain.  Patient given reassurance that there is no apparent acute injury.  Recommend watchful waiting and this issue will likely resolve over the next 1-3 weeks.  Follow up as needed.  Patient appreciates and agrees with current plan.\par \par This note was generated using dragon medical dictation software.  A reasonable effort has been made for proofreading its contents, but typos may still remain.  If there are any questions or points of clarification needed please notify my office.\par

## 2021-08-15 ENCOUNTER — RX RENEWAL (OUTPATIENT)
Age: 51
End: 2021-08-15

## 2021-08-25 ENCOUNTER — FORM ENCOUNTER (OUTPATIENT)
Age: 51
End: 2021-08-25

## 2021-08-29 ENCOUNTER — FORM ENCOUNTER (OUTPATIENT)
Age: 51
End: 2021-08-29

## 2021-09-13 NOTE — H&P PST ADULT - NS ABD PE RECTAL EXAM
Ongoing SW/CM Assessment/Plan of Care Note     See SW/CM flowsheets for goals and other objective data.    Patient/Family discharge goal (s):  Goal #1: Psychosocial needs assessed  Goal #2: Communication facilitated       PT Recommendation:  Recommendation for Discharge: PT WI: Home, Home therapy (pending wife's ability to care for pt)       OT Recommendation:  Recommendations for Discharge: OT WI: Post acute therapy       SLP Recommendation:  Recommendations for Discharge: SLP: Follow up speech therapy for dysphagia at discharge, likely home hospice    Disposition:       Progress note:   SW met with patient's spouse AHCPOA this date regarding discharge planning. Spouse verbalizes concerns with patient's direct discharge home and inquired about alternate options. SW discussed subacute placement.. Patient is eligible to participate in the Post-Acute Network (PAN). Spouse was educated on the availability of the program in selected skilled nursing facilities and the benefits of participating in the PAN program. Provided SNf list and PAN list. Spouse will review subacute choice list and contact this writer with facility options for referral process.      patient refused

## 2021-10-25 ENCOUNTER — LABORATORY RESULT (OUTPATIENT)
Age: 51
End: 2021-10-25

## 2021-10-26 ENCOUNTER — APPOINTMENT (OUTPATIENT)
Dept: CARDIOLOGY | Facility: CLINIC | Age: 51
End: 2021-10-26
Payer: COMMERCIAL

## 2021-10-26 VITALS
HEART RATE: 76 BPM | SYSTOLIC BLOOD PRESSURE: 126 MMHG | WEIGHT: 142 LBS | RESPIRATION RATE: 15 BRPM | BODY MASS INDEX: 27.88 KG/M2 | TEMPERATURE: 98 F | DIASTOLIC BLOOD PRESSURE: 80 MMHG | HEIGHT: 60 IN | OXYGEN SATURATION: 98 %

## 2021-10-26 PROCEDURE — 99213 OFFICE O/P EST LOW 20 MIN: CPT

## 2021-10-26 RX ORDER — CEFDINIR 300 MG/1
300 CAPSULE ORAL
Qty: 20 | Refills: 0 | Status: COMPLETED | COMMUNITY
Start: 2021-04-01 | End: 2021-10-26

## 2021-10-26 RX ORDER — LEVOFLOXACIN 750 MG/1
750 TABLET, FILM COATED ORAL
Qty: 7 | Refills: 0 | Status: COMPLETED | COMMUNITY
Start: 2021-04-01 | End: 2021-10-26

## 2021-10-26 RX ORDER — PREDNISONE 20 MG/1
20 TABLET ORAL
Qty: 5 | Refills: 0 | Status: COMPLETED | COMMUNITY
Start: 2021-03-24 | End: 2021-10-26

## 2021-10-26 RX ORDER — ALBUTEROL SULFATE 90 UG/1
108 (90 BASE) INHALANT RESPIRATORY (INHALATION)
Qty: 18 | Refills: 0 | Status: COMPLETED | COMMUNITY
Start: 2021-03-24 | End: 2021-10-26

## 2021-10-26 RX ORDER — AZITHROMYCIN 250 MG/1
250 TABLET, FILM COATED ORAL
Qty: 6 | Refills: 0 | Status: COMPLETED | COMMUNITY
Start: 2021-03-24 | End: 2021-10-26

## 2021-10-26 RX ORDER — ALBUTEROL SULFATE 2.5 MG/3ML
(2.5 MG/3ML) SOLUTION RESPIRATORY (INHALATION)
Qty: 150 | Refills: 0 | Status: COMPLETED | COMMUNITY
Start: 2021-04-01 | End: 2021-10-26

## 2021-10-26 RX ORDER — MULTIVIT-MIN/FOLIC/VIT K/LYCOP 400-300MCG
25 MCG TABLET ORAL DAILY
Qty: 90 | Refills: 3 | Status: COMPLETED | COMMUNITY
Start: 2021-02-23 | End: 2021-10-26

## 2021-10-26 RX ORDER — DEXAMETHASONE 4 MG/1
4 TABLET ORAL
Qty: 6 | Refills: 0 | Status: COMPLETED | COMMUNITY
Start: 2021-03-24 | End: 2021-10-26

## 2021-10-26 NOTE — REASON FOR VISIT
[Hypertension] : hypertension [Follow-Up - Clinic] : a clinic follow-up of [Dyspnea] : dyspnea [Hyperlipidemia] : hyperlipidemia [CV Risk Factors and Non-Cardiac Disease] : CV risk factors and non-cardiac disease [FreeTextEntry1] : murmur

## 2021-10-26 NOTE — DISCUSSION/SUMMARY
[FreeTextEntry1] : This is a 51-year-old female with past medical history significant for hypercholesterolemia, who comes in for cardiac follow-up evaluation.  She denies chest pain, shortness of breath, dizziness or syncope.   \par She does get occasional dyspnea on exertion.  She has no history of rheumatic fever.  She does not drink excessive caffeine or alcohol.\par The patient discontinued her Crestor 20 mg/day a few months ago.  She was planning her daughter's wedding, and was not focused on her own health at that time.\par Blood work done October 25 off Crestor therapy demonstrated total cholesterol 263, direct LDL cholesterol 174, HDL 47, triglycerides 189, non-HDL cholesterol 216 mg/dL.\par I have explained to the patient that LDLs this elevated are not due to dietary indiscretion.  She had spoken to our registered dietitian in January 2021 and will follow up with her.\par Her cardiac risk factors include hypercholesterolemia, and positive family history of heart disease (her father had coronary artery disease and ultimately had a CABG in his 70s, and mother had hypercholesterolemia).  She has 4 children including a set of boy twins, and 1 boy had a hypoplastic heart syndrome and had 3 surgeries at [facility].\par She will restart Crestor 20 mg/day.  She will have follow-up blood work in 6 to 8 weeks.\par Lifestyle modification was reinforced.\par The patient had normal exercise stress test February 22, 2021.\par Electrocardiogram done January 19, 2021 demonstrates normal sinus rhythm rate 71 bpm is otherwise remarkable for nonspecific ST wave changes.\par The patient had blood work done January 5, 2021 which demonstrated a potassium of 4.0, total cholesterol of 248, HDL of 46, triglycerides of 215, calculated LDL cholesterol 164 mg/dL, non-HDL cholesterol 202 mg/dL.  Hemoglobin A1c was 4.7.\par The patient may also benefit from coronary artery calcium score to determine her enhanced risk.\par \par The patient understands that aerobic exercises must be increased to 40 minutes 4 times per week. A detailed discussion of lifestyle modification was done today. The patient has a good understanding of the diagnosis, and treatment plan. Lifestyle modification was also outlined.

## 2021-10-26 NOTE — ASSESSMENT
[FreeTextEntry1] : Prior note nurse practitioner Jeana July 7, 2021::\par \par This is a 50 year year old female here today for follow up cardiac evaluation. \par She has a past medical history significant for  hypercholesterolemia, COVID 19 March 2021.\par \par Today she is feeling generally well but is here for BP check. She states that she feels that her BP still remains elevated.\par \par -Her cardiac risk factors include hypercholesterolemia, and positive family history of heart disease (her father had coronary artery disease and ultimately had a CABG in his 70s, and mother had hypercholesterolemia).  She has 4 children including a set of boy twins, and 1 boy had a hypoplastic heart syndrome and had 3 surgeries at [facility].\par \par BLOOD PRESSURE:\par -BP is better controlled on today's exam and has actually improved since her last visit with our office. She states she has been under stress but "good stress" (daughter possibly getting  engaged etc). I explained that her BP is coming down nicely and that we will watch it for now since it is improving. She does not wish to be on blood pressure medication at this time. \par \par -I have discussed the importance of maintaining good BP control and reviewed the newest guidelines with the patient while re-enforcing dietary sodium restrictions to no more than 2-3 g daily, DASH diet, life style modifications as well as the goal of maintaining ideal body weight with the patient today. I have advised the patient to avoid the use of over-the-counter medications/ supplements especially NSAIDS.\par \par BLOOD WORK:\par -New blood work was done 4/29/2021 which demonstrated cholesterol of 191 and LDL of 108.\par -She remains on Rosuvastatin 20mg PO DAILY.\par \par CHOLESTEROL CONTROL:\par -Patient will continue the advised  TLC diet and to continue follow-up for treatment of hyperlipidemia and repeat blood testing with diet and exercise. I have discussed different exercises and the importance of maintenance of optimal body weight. The importance of staying within guidelines and recommendations was stressed to the patient today and they acknowledged that they understand this to me verbally.\par \par  -Ms. MILLAN was educated and advised that failure to follow-up with my medical recommendations to lower cholesterol can result in severe health consequences therefore, they will continuing a low saturated and low fat diet and to avoid excessive carbohydrates to help reduce triglycerides and that lowering LDL levels is associated with a significant decrease in serious cardiac events including but not limited to heart attack stroke and overall death. We will continue lipid lowering agents as advised based on blood test results and the patient understands to call if they develop severe muscle discomfort or if they have a reddish tinted discoloration in their urine.\par \par TESTING/REPORTS:\par -EKG done May 03, 2021 which demonstrated regular sinus rhythm with nonspecific ST-T wave changes, BPM of 75.\par \par -The patient had an echocardiogram done on 2/22/2021 demonstrated mild mitral tricuspid and pulmonic regurgitation with normal left ventricular systolic function. \par -EF on echo reported to be 65%.\par -The patient had a normal exercise stress test done on February 22, 2021.\par \par PLAN:\par -She will continue with her usual medications and will contact the office if she is having any complaints between now and their next follow up appointment.\par -She will follow up in 3 months\par -We will discuss a Ca score which may benefit her to determined her enhanced cardiac risk.\par \par I have discussed the plan of care with Ms. ROBERTO MILLAN. She is compliant with all of her medications.\par \par The patient understands that aerobic exercises must be increased to minutes 4 times/week and a detailed discussion of lifestyle modification was done today. \par The patient has a good understanding of the diagnosis, treatment plan and lifestyle modification. \par She will contact me at the office for any questions with their care or any changes in their health status.\par \par The plan of care was discussed with supervising physician, Dr. Archer while present in the office at the time of the visit. \par \par Caleb MEHTA

## 2021-11-22 ENCOUNTER — NON-APPOINTMENT (OUTPATIENT)
Age: 51
End: 2021-11-22

## 2021-11-22 DIAGNOSIS — Z87.42 PERSONAL HISTORY OF OTHER DISEASES OF THE FEMALE GENITAL TRACT: ICD-10-CM

## 2021-11-22 DIAGNOSIS — N84.0 POLYP OF CORPUS UTERI: ICD-10-CM

## 2021-11-22 DIAGNOSIS — Z86.018 PERSONAL HISTORY OF OTHER BENIGN NEOPLASM: ICD-10-CM

## 2021-11-22 DIAGNOSIS — Z86.59 PERSONAL HISTORY OF OTHER MENTAL AND BEHAVIORAL DISORDERS: ICD-10-CM

## 2021-11-22 DIAGNOSIS — Z78.9 OTHER SPECIFIED HEALTH STATUS: ICD-10-CM

## 2021-11-22 DIAGNOSIS — N93.9 ABNORMAL UTERINE AND VAGINAL BLEEDING, UNSPECIFIED: ICD-10-CM

## 2021-11-22 DIAGNOSIS — Z82.79 FAMILY HISTORY OF OTHER CONGENITAL MALFORMATIONS, DEFORMATIONS AND CHROMOSOMAL ABNORMALITIES: ICD-10-CM

## 2021-11-22 DIAGNOSIS — O30.009 TWIN PREGNANCY, UNSPECIFIED NUMBER OF PLACENTA AND UNSPECIFIED NUMBER OF AMNIOTIC SACS, UNSPECIFIED TRIMESTER: ICD-10-CM

## 2021-12-21 ENCOUNTER — LABORATORY RESULT (OUTPATIENT)
Age: 51
End: 2021-12-21

## 2021-12-27 ENCOUNTER — NON-APPOINTMENT (OUTPATIENT)
Age: 51
End: 2021-12-27

## 2021-12-29 DIAGNOSIS — Z12.39 ENCOUNTER FOR OTHER SCREENING FOR MALIGNANT NEOPLASM OF BREAST: ICD-10-CM

## 2022-01-25 ENCOUNTER — RESULT REVIEW (OUTPATIENT)
Age: 52
End: 2022-01-25

## 2022-01-25 ENCOUNTER — APPOINTMENT (OUTPATIENT)
Dept: MAMMOGRAPHY | Facility: IMAGING CENTER | Age: 52
End: 2022-01-25
Payer: COMMERCIAL

## 2022-01-25 ENCOUNTER — APPOINTMENT (OUTPATIENT)
Dept: ULTRASOUND IMAGING | Facility: IMAGING CENTER | Age: 52
End: 2022-01-25
Payer: COMMERCIAL

## 2022-01-25 ENCOUNTER — OUTPATIENT (OUTPATIENT)
Dept: OUTPATIENT SERVICES | Facility: HOSPITAL | Age: 52
LOS: 1 days | End: 2022-01-25
Payer: COMMERCIAL

## 2022-01-25 DIAGNOSIS — Z00.8 ENCOUNTER FOR OTHER GENERAL EXAMINATION: ICD-10-CM

## 2022-01-25 DIAGNOSIS — Z98.890 OTHER SPECIFIED POSTPROCEDURAL STATES: Chronic | ICD-10-CM

## 2022-01-25 PROCEDURE — 77063 BREAST TOMOSYNTHESIS BI: CPT

## 2022-01-25 PROCEDURE — 77063 BREAST TOMOSYNTHESIS BI: CPT | Mod: 26

## 2022-01-25 PROCEDURE — 77067 SCR MAMMO BI INCL CAD: CPT | Mod: 26

## 2022-01-25 PROCEDURE — 77067 SCR MAMMO BI INCL CAD: CPT

## 2022-02-14 ENCOUNTER — APPOINTMENT (OUTPATIENT)
Dept: OBGYN | Facility: CLINIC | Age: 52
End: 2022-02-14
Payer: COMMERCIAL

## 2022-02-14 VITALS
SYSTOLIC BLOOD PRESSURE: 151 MMHG | DIASTOLIC BLOOD PRESSURE: 82 MMHG | WEIGHT: 150 LBS | HEIGHT: 60 IN | BODY MASS INDEX: 29.45 KG/M2 | HEART RATE: 72 BPM

## 2022-02-14 DIAGNOSIS — Z87.42 PERSONAL HISTORY OF OTHER DISEASES OF THE FEMALE GENITAL TRACT: ICD-10-CM

## 2022-02-14 DIAGNOSIS — Z11.51 ENCOUNTER FOR SCREENING FOR HUMAN PAPILLOMAVIRUS (HPV): ICD-10-CM

## 2022-02-14 DIAGNOSIS — Z01.419 ENCOUNTER FOR GYNECOLOGICAL EXAMINATION (GENERAL) (ROUTINE) W/OUT ABNORMAL FINDINGS: ICD-10-CM

## 2022-02-14 PROCEDURE — 99396 PREV VISIT EST AGE 40-64: CPT

## 2022-02-14 PROCEDURE — 36415 COLL VENOUS BLD VENIPUNCTURE: CPT

## 2022-02-14 NOTE — PHYSICAL EXAM
[Chaperone Present] : A chaperone was present in the examining room during all aspects of the physical examination [Appropriately responsive] : appropriately responsive [Alert] : alert [No Acute Distress] : no acute distress [No Lymphadenopathy] : no lymphadenopathy [Soft] : soft [Non-tender] : non-tender [Non-distended] : non-distended [No HSM] : No HSM [No Lesions] : no lesions [No Mass] : no mass [Oriented x3] : oriented x3 [Examination Of The Breasts] : a normal appearance [No Masses] : no breast masses were palpable [Labia Majora] : normal [Labia Minora] : normal [Normal] : normal [Uterine Adnexae] : normal [FreeTextEntry1] : Celina

## 2022-02-14 NOTE — HISTORY OF PRESENT ILLNESS
[FreeTextEntry1] : 50 yo presents for an annual physical. She is doing well and has no complaints. LMP 1/20/2022.

## 2022-02-16 LAB
FSH SERPL-MCNC: 4.6 IU/L
HPV HIGH+LOW RISK DNA PNL CVX: NOT DETECTED
LH SERPL-ACNC: 8 IU/L
TSH SERPL-ACNC: 3.06 UIU/ML

## 2022-02-21 LAB — CYTOLOGY CVX/VAG DOC THIN PREP: NORMAL

## 2022-02-28 ENCOUNTER — APPOINTMENT (OUTPATIENT)
Dept: OBGYN | Facility: CLINIC | Age: 52
End: 2022-02-28
Payer: COMMERCIAL

## 2022-02-28 VITALS
BODY MASS INDEX: 29.45 KG/M2 | SYSTOLIC BLOOD PRESSURE: 151 MMHG | HEIGHT: 60 IN | DIASTOLIC BLOOD PRESSURE: 90 MMHG | WEIGHT: 150 LBS

## 2022-02-28 PROCEDURE — 99213 OFFICE O/P EST LOW 20 MIN: CPT

## 2022-02-28 NOTE — PLAN
[FreeTextEntry1] : 50 yo presents with right breast hematoma, size of 2 by 2 circular.\par -rx Breast sono\par -RTO 1 week with Breast specialist Dr. Rios \par \par

## 2022-02-28 NOTE — PHYSICAL EXAM
[Chaperone Present] : A chaperone was present in the examining room during all aspects of the physical examination [Appropriately responsive] : appropriately responsive [Alert] : alert [No Acute Distress] : no acute distress [No Lymphadenopathy] : no lymphadenopathy [Non-tender] : non-tender [Non-distended] : non-distended [No HSM] : No HSM [No Lesions] : no lesions [No Mass] : no mass [Oriented x3] : oriented x3 [FreeTextEntry1] : Celina [Soft] : soft [FreeTextEntry6] : right breast ecchymosis, size of 2/2 circular [Normal] : normal [The Left Breast Was Examined] : a normal appearance [No Masses] : no breast masses were palpable

## 2022-03-08 ENCOUNTER — RESULT REVIEW (OUTPATIENT)
Age: 52
End: 2022-03-08

## 2022-03-08 ENCOUNTER — APPOINTMENT (OUTPATIENT)
Dept: ULTRASOUND IMAGING | Facility: IMAGING CENTER | Age: 52
End: 2022-03-08
Payer: COMMERCIAL

## 2022-03-08 ENCOUNTER — APPOINTMENT (OUTPATIENT)
Dept: MAMMOGRAPHY | Facility: IMAGING CENTER | Age: 52
End: 2022-03-08
Payer: COMMERCIAL

## 2022-03-08 ENCOUNTER — OUTPATIENT (OUTPATIENT)
Dept: OUTPATIENT SERVICES | Facility: HOSPITAL | Age: 52
LOS: 1 days | End: 2022-03-08
Payer: COMMERCIAL

## 2022-03-08 DIAGNOSIS — Z98.890 OTHER SPECIFIED POSTPROCEDURAL STATES: Chronic | ICD-10-CM

## 2022-03-08 DIAGNOSIS — Z00.8 ENCOUNTER FOR OTHER GENERAL EXAMINATION: ICD-10-CM

## 2022-03-08 DIAGNOSIS — N64.89 OTHER SPECIFIED DISORDERS OF BREAST: ICD-10-CM

## 2022-03-08 PROCEDURE — 76641 ULTRASOUND BREAST COMPLETE: CPT | Mod: 26,50

## 2022-03-08 PROCEDURE — 76641 ULTRASOUND BREAST COMPLETE: CPT

## 2022-03-21 ENCOUNTER — APPOINTMENT (OUTPATIENT)
Dept: CARDIOLOGY | Facility: CLINIC | Age: 52
End: 2022-03-21
Payer: COMMERCIAL

## 2022-03-21 VITALS
TEMPERATURE: 98 F | SYSTOLIC BLOOD PRESSURE: 136 MMHG | HEART RATE: 73 BPM | WEIGHT: 149 LBS | BODY MASS INDEX: 29.25 KG/M2 | RESPIRATION RATE: 15 BRPM | DIASTOLIC BLOOD PRESSURE: 84 MMHG | HEIGHT: 60 IN | OXYGEN SATURATION: 99 %

## 2022-03-21 PROCEDURE — 99214 OFFICE O/P EST MOD 30 MIN: CPT

## 2022-03-21 NOTE — REASON FOR VISIT
[CV Risk Factors and Non-Cardiac Disease] : CV risk factors and non-cardiac disease [Hypertension] : hypertension [Follow-Up - Clinic] : a clinic follow-up of [Dyspnea] : dyspnea [Hyperlipidemia] : hyperlipidemia [FreeTextEntry1] : murmur

## 2022-03-21 NOTE — PHYSICAL EXAM
[Well Developed] : well developed [Well Nourished] : well nourished [No Acute Distress] : no acute distress [Normal Venous Pressure] : normal venous pressure [No Carotid Bruit] : no carotid bruit [Normal S1, S2] : normal S1, S2 [No Murmur] : no murmur [No Rub] : no rub [Clear Lung Fields] : clear lung fields [Good Air Entry] : good air entry [No Respiratory Distress] : no respiratory distress  [Soft] : abdomen soft [Non Tender] : non-tender [No Masses/organomegaly] : no masses/organomegaly [Normal Bowel Sounds] : normal bowel sounds [Normal Gait] : normal gait [No Edema] : no edema [No Cyanosis] : no cyanosis [No Clubbing] : no clubbing [No Varicosities] : no varicosities [No Rash] : no rash [No Skin Lesions] : no skin lesions [Moves all extremities] : moves all extremities [No Focal Deficits] : no focal deficits [Normal Speech] : normal speech [Alert and Oriented] : alert and oriented [Normal memory] : normal memory [General Appearance - Well Developed] : well developed [Normal Appearance] : normal appearance [Well Groomed] : well groomed [General Appearance - Well Nourished] : well nourished [No Deformities] : no deformities [General Appearance - In No Acute Distress] : no acute distress [Normal Conjunctiva] : the conjunctiva exhibited no abnormalities [Normal Oral Mucosa] : normal oral mucosa [Normal Jugular Venous A Waves Present] : normal jugular venous A waves present [Normal Jugular Venous V Waves Present] : normal jugular venous V waves present [No Jugular Venous Marinelli A Waves] : no jugular venous marinelli A waves [Respiration, Rhythm And Depth] : normal respiratory rhythm and effort [Exaggerated Use Of Accessory Muscles For Inspiration] : no accessory muscle use [Auscultation Breath Sounds / Voice Sounds] : lungs were clear to auscultation bilaterally [Bowel Sounds] : normal bowel sounds [Abdomen Soft] : soft [Abnormal Walk] : normal gait [Gait - Sufficient For Exercise Testing] : the gait was sufficient for exercise testing [Nail Clubbing] : no clubbing of the fingernails [Cyanosis, Localized] : no localized cyanosis [Skin Color & Pigmentation] : normal skin color and pigmentation [Skin Turgor] : normal skin turgor [] : no rash [Affect] : the affect was normal [Oriented To Time, Place, And Person] : oriented to person, place, and time [Mood] : the mood was normal [No Anxiety] : not feeling anxious [5th Left ICS - MCL] : palpated at the 5th LICS in the midclavicular line [Normal] : normal [No Precordial Heave] : no precordial heave was noted [Normal Rate] : normal [Rhythm Regular] : regular [Normal S1] : normal S1 [Normal S2] : normal S2 [No Gallop] : no gallop heard [II] : a grade 2 [2+] : left 2+ [No Abnormalities] : the abdominal aorta was not enlarged and no bruit was heard [No Pitting Edema] : no pitting edema present [S3] : no S3 [S4] : no S4 [Click] : no click [Pericardial Rub] : no pericardial rub [Right Carotid Bruit] : no bruit heard over the right carotid [Left Carotid Bruit] : no bruit heard over the left carotid [Right Femoral Bruit] : no bruit heard over the right femoral artery [Left Femoral Bruit] : no bruit heard over the left femoral artery

## 2022-03-21 NOTE — DISCUSSION/SUMMARY
[FreeTextEntry1] : This is a 51-year-old female with past medical history significant for hypercholesterolemia, who comes in for cardiac follow-up evaluation.  She denies chest pain, shortness of breath, dizziness or syncope.   \par She does get occasional dyspnea on exertion.  She has no history of rheumatic fever.  She does not drink excessive caffeine or alcohol.\par Her cardiac risk factors include hypercholesterolemia, and positive family history of heart disease (her father had coronary artery disease and ultimately had a CABG in his 70s, and mother had hypercholesterolemia).  She has 4 children including a set of boy twins, and 1 boy had a hypoplastic heart syndrome and had 3 surgeries at [facility].\par The patient been stable on Crestor 20 mg daily.\par She had blood work done today with her primary care physician and will get me a copy of those results when they become available.\par Her blood pressure today is in the upper limits of normal.  She reports that her blood pressure was elevated when she was at her gynecologist.\par She prefers to remain off medication and wishes lifestyle change.\par I have asked her to follow-up with our registered dietitian and will take this under consideration.\par She will continue on her current diet and exercise program.\par She will follow up with me in 6 to 8 weeks to evaluate her blood pressure.\par Blood work done October 25 off Crestor therapy demonstrated total cholesterol 263, direct LDL cholesterol 174, HDL 47, triglycerides 189, non-HDL cholesterol 216 mg/dL.\par I have explained to the patient that LDLs this elevated are not due to dietary indiscretion.  She had spoken to our registered dietitian in January 2021 and will follow up with her.\par Lifestyle modification was reinforced.\par The patient had normal exercise stress test February 22, 2021.\par Electrocardiogram done January 19, 2021 demonstrates normal sinus rhythm rate 71 bpm is otherwise remarkable for nonspecific ST wave changes.\par The patient had blood work done January 5, 2021 which demonstrated a potassium of 4.0, total cholesterol of 248, HDL of 46, triglycerides of 215, calculated LDL cholesterol 164 mg/dL, non-HDL cholesterol 202 mg/dL.  Hemoglobin A1c was 4.7.\par The patient may also benefit from coronary artery calcium score to determine her enhanced risk.\par \par The patient understands that aerobic exercises must be increased to 40 minutes 4 times per week. A detailed discussion of lifestyle modification was done today. The patient has a good understanding of the diagnosis, and treatment plan. Lifestyle modification was also outlined.

## 2022-03-21 NOTE — ASSESSMENT
[FreeTextEntry1] : Prior note nurse practitioner Jeana July 7, 2021::\par \par This is a 50 year year old female here today for follow up cardiac evaluation. \par She has a past medical history significant for  hypercholesterolemia, COVID 19 March 2021.\par \par Today she is feeling generally well but is here for BP check. She states that she feels that her BP still remains elevated.\par \par -Her cardiac risk factors include hypercholesterolemia, and positive family history of heart disease (her father had coronary artery disease and ultimately had a CABG in his 70s, and mother had hypercholesterolemia).  She has 4 children including a set of boy twins, and 1 boy had a hypoplastic heart syndrome and had 3 surgeries at [facility].\par \par BLOOD PRESSURE:\par -BP is better controlled on today's exam and has actually improved since her last visit with our office. She states she has been under stress but "good stress" (daughter possibly getting  engaged etc). I explained that her BP is coming down nicely and that we will watch it for now since it is improving. She does not wish to be on blood pressure medication at this time. \par \par -I have discussed the importance of maintaining good BP control and reviewed the newest guidelines with the patient while re-enforcing dietary sodium restrictions to no more than 2-3 g daily, DASH diet, life style modifications as well as the goal of maintaining ideal body weight with the patient today. I have advised the patient to avoid the use of over-the-counter medications/ supplements especially NSAIDS.\par \par BLOOD WORK:\par -New blood work was done 4/29/2021 which demonstrated cholesterol of 191 and LDL of 108.\par -She remains on Rosuvastatin 20mg PO DAILY.\par \par CHOLESTEROL CONTROL:\par -Patient will continue the advised  TLC diet and to continue follow-up for treatment of hyperlipidemia and repeat blood testing with diet and exercise. I have discussed different exercises and the importance of maintenance of optimal body weight. The importance of staying within guidelines and recommendations was stressed to the patient today and they acknowledged that they understand this to me verbally.\par \par  -Ms. MILLAN was educated and advised that failure to follow-up with my medical recommendations to lower cholesterol can result in severe health consequences therefore, they will continuing a low saturated and low fat diet and to avoid excessive carbohydrates to help reduce triglycerides and that lowering LDL levels is associated with a significant decrease in serious cardiac events including but not limited to heart attack stroke and overall death. We will continue lipid lowering agents as advised based on blood test results and the patient understands to call if they develop severe muscle discomfort or if they have a reddish tinted discoloration in their urine.\par \par TESTING/REPORTS:\par -EKG done May 03, 2021 which demonstrated regular sinus rhythm with nonspecific ST-T wave changes, BPM of 75.\par \par -The patient had an echocardiogram done on 2/22/2021 demonstrated mild mitral tricuspid and pulmonic regurgitation with normal left ventricular systolic function. \par -EF on echo reported to be 65%.\par -The patient had a normal exercise stress test done on February 22, 2021.\par \par PLAN:\par -She will continue with her usual medications and will contact the office if she is having any complaints between now and their next follow up appointment.\par -She will follow up in 3 months\par -We will discuss a Ca score which may benefit her to determined her enhanced cardiac risk.\par \par I have discussed the plan of care with Ms. ROBERTO MILLAN. She is compliant with all of her medications.\par \par The patient understands that aerobic exercises must be increased to minutes 4 times/week and a detailed discussion of lifestyle modification was done today. \par The patient has a good understanding of the diagnosis, treatment plan and lifestyle modification. \par She will contact me at the office for any questions with their care or any changes in their health status.\par \par The plan of care was discussed with supervising physician, Dr. Archer while present in the office at the time of the visit. \par \par Caleb MEHTA

## 2022-04-26 ENCOUNTER — APPOINTMENT (OUTPATIENT)
Dept: ULTRASOUND IMAGING | Facility: IMAGING CENTER | Age: 52
End: 2022-04-26
Payer: COMMERCIAL

## 2022-04-26 ENCOUNTER — RESULT REVIEW (OUTPATIENT)
Age: 52
End: 2022-04-26

## 2022-04-26 ENCOUNTER — OUTPATIENT (OUTPATIENT)
Dept: OUTPATIENT SERVICES | Facility: HOSPITAL | Age: 52
LOS: 1 days | End: 2022-04-26
Payer: COMMERCIAL

## 2022-04-26 DIAGNOSIS — Z98.890 OTHER SPECIFIED POSTPROCEDURAL STATES: Chronic | ICD-10-CM

## 2022-04-26 DIAGNOSIS — N64.89 OTHER SPECIFIED DISORDERS OF BREAST: ICD-10-CM

## 2022-04-26 PROCEDURE — 76642 ULTRASOUND BREAST LIMITED: CPT | Mod: 26,RT

## 2022-04-26 PROCEDURE — 76642 ULTRASOUND BREAST LIMITED: CPT

## 2022-04-26 PROCEDURE — 76641 ULTRASOUND BREAST COMPLETE: CPT

## 2022-06-07 ENCOUNTER — LABORATORY RESULT (OUTPATIENT)
Age: 52
End: 2022-06-07

## 2022-06-07 ENCOUNTER — APPOINTMENT (OUTPATIENT)
Dept: CARDIOLOGY | Facility: CLINIC | Age: 52
End: 2022-06-07
Payer: COMMERCIAL

## 2022-06-07 VITALS
RESPIRATION RATE: 16 BRPM | HEART RATE: 66 BPM | DIASTOLIC BLOOD PRESSURE: 82 MMHG | BODY MASS INDEX: 28.07 KG/M2 | WEIGHT: 143 LBS | HEIGHT: 60 IN | SYSTOLIC BLOOD PRESSURE: 130 MMHG | OXYGEN SATURATION: 99 % | TEMPERATURE: 98 F

## 2022-06-07 PROCEDURE — 93015 CV STRESS TEST SUPVJ I&R: CPT

## 2022-06-07 PROCEDURE — 99213 OFFICE O/P EST LOW 20 MIN: CPT | Mod: 25

## 2022-06-07 NOTE — DISCUSSION/SUMMARY
[FreeTextEntry1] : This is a 51-year-old female with past medical history significant for hypercholesterolemia, who comes in for cardiac follow-up evaluation.  She denies chest pain, shortness of breath, dizziness or syncope.   \par She does get occasional dyspnea on exertion.  She has no history of rheumatic fever.  She does not drink excessive caffeine or alcohol.\par Her cardiac risk factors include hypercholesterolemia, and positive family history of heart disease (her father had coronary artery disease and ultimately had a CABG in his 70s, and mother had hypercholesterolemia).  She has 4 children including a set of boy twins, and 1 boy had a hypoplastic heart syndrome and had 3 surgeries at [facility].\par The patient had a normal exercise stress test June 7, 2022.\par The patient had a normal blood pressure response to exercise.\par She will schedule a 24-hour ambulatory blood pressure monitor to evaluate her blood pressure throughout a 24-hour window.\par She understands she must limit her salt intake.  She is trying to lose weight.  She walks for an hour 3 to 4 days/week.\par Echo Doppler examination done February 22, 2021 demonstrated minimal mitral valve regurgitation, mild tricuspid valve regurgitation physiologic pulmonic valve regurgitation with a normal ejection fraction of 64 to 65%.\par Blood work done March 21, 2022 with her primary care physician demonstrated cholesterol 187, HDL of 57, triglycerides 194, LDL calculated 99 mg/dL and non-HDL cholesterol 130 mg/dL with a hemoglobin A1c of 5.5.  She had normal liver function test.\par \par The patient been stable on Crestor 20 mg daily.\par She reports that her blood pressure was elevated when she was at her gynecologist.\par She prefers to remain off medication and wishes lifestyle change.\par She will continue on her current diet and exercise program.\par \par Blood work done October 25 off Crestor therapy demonstrated total cholesterol 263, direct LDL cholesterol 174, HDL 47, triglycerides 189, non-HDL cholesterol 216 mg/dL.\par Lifestyle modification was reinforced.\par The patient had normal exercise stress test February 22, 2021.\par Electrocardiogram done January 19, 2021 demonstrates normal sinus rhythm rate 71 bpm is otherwise remarkable for nonspecific ST wave changes.\par The patient had blood work done January 5, 2021 which demonstrated a potassium of 4.0, total cholesterol of 248, HDL of 46, triglycerides of 215, calculated LDL cholesterol 164 mg/dL, non-HDL cholesterol 202 mg/dL.  Hemoglobin A1c was 4.7.\par The patient may also benefit from coronary artery calcium score to determine her enhanced risk.\par \par The patient understands that aerobic exercises must be increased to 40 minutes 4 times per week. A detailed discussion of lifestyle modification was done today. The patient has a good understanding of the diagnosis, and treatment plan. Lifestyle modification was also outlined.

## 2022-06-07 NOTE — PHYSICAL EXAM
[Well Developed] : well developed [Well Nourished] : well nourished [No Acute Distress] : no acute distress [Normal Venous Pressure] : normal venous pressure [No Carotid Bruit] : no carotid bruit [Normal S1, S2] : normal S1, S2 [No Rub] : no rub [I] : a grade 1 [Clear Lung Fields] : clear lung fields [Good Air Entry] : good air entry [No Respiratory Distress] : no respiratory distress  [Soft] : abdomen soft [Non Tender] : non-tender [No Masses/organomegaly] : no masses/organomegaly [Normal Bowel Sounds] : normal bowel sounds [Normal Gait] : normal gait [No Edema] : no edema [No Cyanosis] : no cyanosis [No Clubbing] : no clubbing [No Varicosities] : no varicosities [No Rash] : no rash [No Skin Lesions] : no skin lesions [Moves all extremities] : moves all extremities [No Focal Deficits] : no focal deficits [Normal Speech] : normal speech [Alert and Oriented] : alert and oriented [Normal memory] : normal memory [General Appearance - Well Developed] : well developed [Normal Appearance] : normal appearance [Well Groomed] : well groomed [General Appearance - Well Nourished] : well nourished [No Deformities] : no deformities [General Appearance - In No Acute Distress] : no acute distress [Normal Conjunctiva] : the conjunctiva exhibited no abnormalities [Normal Oral Mucosa] : normal oral mucosa [Normal Jugular Venous A Waves Present] : normal jugular venous A waves present [Normal Jugular Venous V Waves Present] : normal jugular venous V waves present [No Jugular Venous Marinelli A Waves] : no jugular venous marinelli A waves [Respiration, Rhythm And Depth] : normal respiratory rhythm and effort [Exaggerated Use Of Accessory Muscles For Inspiration] : no accessory muscle use [Auscultation Breath Sounds / Voice Sounds] : lungs were clear to auscultation bilaterally [Bowel Sounds] : normal bowel sounds [Abdomen Soft] : soft [Abnormal Walk] : normal gait [Gait - Sufficient For Exercise Testing] : the gait was sufficient for exercise testing [Nail Clubbing] : no clubbing of the fingernails [Cyanosis, Localized] : no localized cyanosis [Skin Color & Pigmentation] : normal skin color and pigmentation [Skin Turgor] : normal skin turgor [] : no rash [Oriented To Time, Place, And Person] : oriented to person, place, and time [Affect] : the affect was normal [Mood] : the mood was normal [No Anxiety] : not feeling anxious [5th Left ICS - MCL] : palpated at the 5th LICS in the midclavicular line [Normal] : normal [No Precordial Heave] : no precordial heave was noted [Normal Rate] : normal [Rhythm Regular] : regular [Normal S1] : normal S1 [Normal S2] : normal S2 [No Gallop] : no gallop heard [II] : a grade 2 [2+] : left 2+ [No Abnormalities] : the abdominal aorta was not enlarged and no bruit was heard [No Pitting Edema] : no pitting edema present [S3] : no S3 [S4] : no S4 [Click] : no click [Pericardial Rub] : no pericardial rub [Right Carotid Bruit] : no bruit heard over the right carotid [Left Carotid Bruit] : no bruit heard over the left carotid [Right Femoral Bruit] : no bruit heard over the right femoral artery [Left Femoral Bruit] : no bruit heard over the left femoral artery

## 2022-06-07 NOTE — ASSESSMENT
[FreeTextEntry1] : Prior note nurse practitioner Jeana July 7, 2021::\par \par This is a 50 year year old female here today for follow up cardiac evaluation. \par She has a past medical history significant for  hypercholesterolemia, COVID 19 March 2021.\par \par Today she is feeling generally well but is here for BP check. She states that she feels that her BP still remains elevated.\par \par -Her cardiac risk factors include hypercholesterolemia, and positive family history of heart disease (her father had coronary artery disease and ultimately had a CABG in his 70s, and mother had hypercholesterolemia).  She has 4 children including a set of boy twins, and 1 boy had a hypoplastic heart syndrome and had 3 surgeries at [facility].\par \par BLOOD PRESSURE:\par -BP is better controlled on today's exam and has actually improved since her last visit with our office. She states she has been under stress but "good stress" (daughter possibly getting  engaged etc). I explained that her BP is coming down nicely and that we will watch it for now since it is improving. She does not wish to be on blood pressure medication at this time. \par \par -I have discussed the importance of maintaining good BP control and reviewed the newest guidelines with the patient while re-enforcing dietary sodium restrictions to no more than 2-3 g daily, DASH diet, life style modifications as well as the goal of maintaining ideal body weight with the patient today. I have advised the patient to avoid the use of over-the-counter medications/ supplements especially NSAIDS.\par \par BLOOD WORK:\par -New blood work was done 4/29/2021 which demonstrated cholesterol of 191 and LDL of 108.\par -She remains on Rosuvastatin 20mg PO DAILY.\par \par CHOLESTEROL CONTROL:\par -Patient will continue the advised  TLC diet and to continue follow-up for treatment of hyperlipidemia and repeat blood testing with diet and exercise. I have discussed different exercises and the importance of maintenance of optimal body weight. The importance of staying within guidelines and recommendations was stressed to the patient today and they acknowledged that they understand this to me verbally.\par \par  -Ms. MILLAN was educated and advised that failure to follow-up with my medical recommendations to lower cholesterol can result in severe health consequences therefore, they will continuing a low saturated and low fat diet and to avoid excessive carbohydrates to help reduce triglycerides and that lowering LDL levels is associated with a significant decrease in serious cardiac events including but not limited to heart attack stroke and overall death. We will continue lipid lowering agents as advised based on blood test results and the patient understands to call if they develop severe muscle discomfort or if they have a reddish tinted discoloration in their urine.\par \par TESTING/REPORTS:\par -EKG done May 03, 2021 which demonstrated regular sinus rhythm with nonspecific ST-T wave changes, BPM of 75.\par \par -The patient had an echocardiogram done on 2/22/2021 demonstrated mild mitral tricuspid and pulmonic regurgitation with normal left ventricular systolic function. \par -EF on echo reported to be 65%.\par -The patient had a normal exercise stress test done on February 22, 2021.\par \par PLAN:\par -She will continue with her usual medications and will contact the office if she is having any complaints between now and their next follow up appointment.\par -She will follow up in 3 months\par -We will discuss a Ca score which may benefit her to determined her enhanced cardiac risk.\par \par I have discussed the plan of care with Ms. ROBERTO MILLAN. She is compliant with all of her medications.\par \par The patient understands that aerobic exercises must be increased to minutes 4 times/week and a detailed discussion of lifestyle modification was done today. \par The patient has a good understanding of the diagnosis, treatment plan and lifestyle modification. \par She will contact me at the office for any questions with their care or any changes in their health status.\par \par The plan of care was discussed with supervising physician, Dr. Archer while present in the office at the time of the visit. \par \par Caleb MEHTA

## 2022-06-14 ENCOUNTER — APPOINTMENT (OUTPATIENT)
Dept: CARDIOLOGY | Facility: CLINIC | Age: 52
End: 2022-06-14
Payer: COMMERCIAL

## 2022-06-14 DIAGNOSIS — R03.0 ELEVATED BLOOD-PRESSURE READING, W/OUT DIAGNOSIS OF HYPERTENSION: ICD-10-CM

## 2022-06-14 PROCEDURE — 93784 AMBL BP MNTR W/SOFTWARE: CPT

## 2022-06-20 PROBLEM — R03.0 WHITE COAT SYNDROME WITHOUT DIAGNOSIS OF HYPERTENSION: Status: ACTIVE | Noted: 2022-06-20

## 2022-06-20 PROBLEM — R03.0 WHITE COAT SYNDROME WITHOUT DIAGNOSIS OF HYPERTENSION: Status: RESOLVED | Noted: 2022-06-07 | Resolved: 2022-06-20

## 2022-06-27 ENCOUNTER — APPOINTMENT (OUTPATIENT)
Dept: CARDIOLOGY | Facility: CLINIC | Age: 52
End: 2022-06-27

## 2022-07-05 ENCOUNTER — NON-APPOINTMENT (OUTPATIENT)
Age: 52
End: 2022-07-05

## 2022-07-05 ENCOUNTER — APPOINTMENT (OUTPATIENT)
Dept: CARDIOLOGY | Facility: CLINIC | Age: 52
End: 2022-07-05

## 2022-07-05 VITALS
TEMPERATURE: 98.2 F | HEIGHT: 60 IN | WEIGHT: 147 LBS | DIASTOLIC BLOOD PRESSURE: 80 MMHG | OXYGEN SATURATION: 100 % | RESPIRATION RATE: 16 BRPM | SYSTOLIC BLOOD PRESSURE: 132 MMHG | HEART RATE: 70 BPM | BODY MASS INDEX: 28.86 KG/M2

## 2022-07-05 PROCEDURE — 93000 ELECTROCARDIOGRAM COMPLETE: CPT

## 2022-07-05 PROCEDURE — 99214 OFFICE O/P EST MOD 30 MIN: CPT | Mod: 25

## 2022-07-05 NOTE — ASSESSMENT
[FreeTextEntry1] : This is a 51-year-old female with past medical history significant for hypercholesterolemia, who comes in for cardiac follow-up evaluation and stress test. Today, she is feeling well and denies chest pain, shortness of breath, dizziness or syncope.   \par She does get occasional dyspnea on exertion.  She has no history of rheumatic fever.  She does not drink excessive caffeine or alcohol.\par \par Her cardiac risk factors include hypercholesterolemia, and positive family history of heart disease (her father had coronary artery disease and ultimately had a CABG in his 70s, and mother had hypercholesterolemia).  She has 4 children including a set of boy twins, and 1 boy had a hypoplastic heart syndrome and had 3 surgeries at [facility].\par \par She is here today for follow-up blood pressure and had a 24-hour blood pressure monitor placed on June 14, 2022 which demonstrated stage I high blood pressure during the night and stage II high blood pressure during the day.  She states that she has been under a lot of stress recently due to the recent passing of her mother and problems with her children.  She does not know if she has sleep apnea but may have been told by her  that she does snore.  She states that she never had high blood pressure before and does not know why this is a new finding.  She states that her diet is "pretty good "and that she does not have high salt.\par \par BLOOD PRESSURE:\par -BP is actually better controlled on today's visit.  She states that when she was wearing the 24-hour blood pressure cuff she went to go visit her father and had a very stressful day with him.  At this time she really does not wish to be on any blood pressure medication.\par  \par -I have discussed the importance of maintaining good BP control and reviewed the newest guidelines with the patient while re-enforcing dietary sodium restrictions to no more than 2-3 g daily, DASH diet, life style modifications as well as the goal of maintaining ideal body weight with the patient today. I have advised the patient to avoid the use of over-the-counter medications/ supplements especially NSAIDS.\par \par SLEEP APNEA ASSESSMENT:\par She also gives a history of occasional fatigue during the day, and snoring at nighttime. She  may have had an isolated apneic episode. The patient will schedule a sleep study to r/o sleep apnea which may be contributing to their HTN. A detailed explanation of the use of the machinery, the risks and benefits of sleep apnea were explained to the patient.  She  has a good understanding of utilization of the machinery and further recommendation will be made after the results of sleep study are available for review\par \par Sleep apnea is associated with adverse clinical consequences which an affect most organ systems. Cardiovascular disease risk includes arrhythmias, atrial fibrillation, hypertension, coronary artery disease, and stroke. Metabolic disorders include diabetes type 2, non-alcoholic fatty liver disease. Mood disorder especially depression; and cognitive decline especially in the elderly. Associations with chronic reflux/Mckeon’s esophagus some but not all inclusive. Reasons include arousal consistent with hypopnea; respiratory events most prominent in REM sleep or supine position; therefore sleep staging and body position are important for accurate diagnosis and estimation of AHI. \par \par BLOOD WORK:\par -Blood work done June 7, 2022 demonstrated cholesterol of 145, triglycerides of 206, HDL 50 and LDL of 64.\par -Blood work was done 4/29/2021 which demonstrated cholesterol of 191 and LDL of 108.\par -She remains on Rosuvastatin 20mg PO DAILY.\par \par CHOLESTEROL CONTROL:\par -Patient will continue the advised  TLC diet and to continue follow-up for treatment of hyperlipidemia and repeat blood testing with diet and exercise. I have discussed different exercises and the importance of maintenance of optimal body weight. The importance of staying within guidelines and recommendations was stressed to the patient today and they acknowledged that they understand this to me verbally.\par \par  -Ms. MILLAN was educated and advised that failure to follow-up with my medical recommendations to lower cholesterol can result in severe health consequences therefore, they will continuing a low saturated and low fat diet and to avoid excessive carbohydrates to help reduce triglycerides and that lowering LDL levels is associated with a significant decrease in serious cardiac events including but not limited to heart attack stroke and overall death. We will continue lipid lowering agents as advised based on blood test results and the patient understands to call if they develop severe muscle discomfort or if they have a reddish tinted discoloration in their urine.\par \par TESTING/REPORTS:\par -EKG done today 07/05/2022 which demonstrated regular sinus rhythm with nonspecific ST-T wave changes BPM of 70.\par \par -24-hour blood pressure monitor done June 14, 2022 demonstrated stage I hypertension in the night and stage II hypertension during the day.\par \par -The patient had a normal exercise stress test June 7, 2022.\par The patient had a normal blood pressure response to exercise.\par \par -EKG done May 03, 2021 which demonstrated regular sinus rhythm with nonspecific ST-T wave changes, BPM of 75.\par \par -The patient had an echocardiogram done on 2/22/2021 demonstrated mild mitral tricuspid and pulmonic regurgitation with normal left ventricular systolic function. \par -EF on echo reported to be 65%.\par \par -The patient had a normal exercise stress test done on February 22, 2021.\par \par PLAN:\par -Sleep study ordered to evaluate her risk for sleep apnea.\par -She will follow-up in 1 month for repeat blood pressure check and I explained that she should have her sleep study done prior to this exam.  If her blood pressure is borderline elevated we can consider starting her on telmisartan 40 mg daily for better management and control.  She states that she has been less stressed as of lately and will practice low-salt and caffeine diet.\par \par I have discussed the plan of care with Ms. ROBERTO MILLAN. She is compliant with all of her medications.\par \par The patient understands that aerobic exercises must be increased to minutes 4 times/week and a detailed discussion of lifestyle modification was done today. \par The patient has a good understanding of the diagnosis, treatment plan and lifestyle modification. \par She will contact me at the office for any questions with their care or any changes in their health status.\par \par The plan of care was discussed with supervising physician, Dr. Archer while present in the office at the time of the visit. \par \par Caleb MEHTA

## 2022-07-05 NOTE — DISCUSSION/SUMMARY
[FreeTextEntry1] : Dr. Archer-(PRIOR VISIT and PMH WITH Dr. Archer): \par This is a 51-year-old female with past medical history significant for hypercholesterolemia, who comes in for cardiac follow-up evaluation.  She denies chest pain, shortness of breath, dizziness or syncope.   \par She does get occasional dyspnea on exertion.  She has no history of rheumatic fever.  She does not drink excessive caffeine or alcohol.\par \par Her cardiac risk factors include hypercholesterolemia, and positive family history of heart disease (her father had coronary artery disease and ultimately had a CABG in his 70s, and mother had hypercholesterolemia).  She has 4 children including a set of boy twins, and 1 boy had a hypoplastic heart syndrome and had 3 surgeries at [facility].\par \par The patient had a normal exercise stress test June 7, 2022.\par The patient had a normal blood pressure response to exercise.\par \par She will schedule a 24-hour ambulatory blood pressure monitor to evaluate her blood pressure throughout a 24-hour window.\par \par Echo Doppler examination done February 22, 2021 demonstrated minimal mitral valve regurgitation, mild tricuspid valve regurgitation physiologic pulmonic valve regurgitation with a normal ejection fraction of 64 to 65%.\par \par The patient had normal exercise stress test February 22, 2021.\par \par Electrocardiogram done January 19, 2021 demonstrates normal sinus rhythm rate 71 bpm is otherwise remarkable for nonspecific ST wave changes.\par \par Blood work done March 21, 2022 with her primary care physician demonstrated cholesterol 187, HDL of 57, triglycerides 194, LDL calculated 99 mg/dL and non-HDL cholesterol 130 mg/dL with a hemoglobin A1c of 5.5.  She had normal liver function test.\par The patient been stable on Crestor 20 mg daily.\par \par She reports that her blood pressure was elevated when she was at her gynecologist.\par She prefers to remain off medication and wishes lifestyle change.\par She will continue on her current diet and exercise program.\par \par Blood work done October 25 off Crestor therapy demonstrated total cholesterol 263, direct LDL cholesterol 174, HDL 47, triglycerides 189, non-HDL cholesterol 216 mg/dL.\par \par The patient had blood work done January 5, 2021 which demonstrated a potassium of 4.0, total cholesterol of 248, HDL of 46, triglycerides of 215, calculated LDL cholesterol 164 mg/dL, non-HDL cholesterol 202 mg/dL.  Hemoglobin A1c was 4.7.\par The patient may also benefit from coronary artery calcium score to determine her enhanced risk.\par \par The patient understands that aerobic exercises must be increased to 40 minutes 4 times per week. A detailed discussion of lifestyle modification was done today. The patient has a good understanding of the diagnosis, and treatment plan. Lifestyle modification was also outlined.

## 2022-07-19 ENCOUNTER — APPOINTMENT (OUTPATIENT)
Dept: CARDIOLOGY | Facility: CLINIC | Age: 52
End: 2022-07-19

## 2022-09-06 ENCOUNTER — LABORATORY RESULT (OUTPATIENT)
Age: 52
End: 2022-09-06

## 2022-09-06 ENCOUNTER — APPOINTMENT (OUTPATIENT)
Dept: CARDIOLOGY | Facility: CLINIC | Age: 52
End: 2022-09-06

## 2022-09-06 ENCOUNTER — NON-APPOINTMENT (OUTPATIENT)
Age: 52
End: 2022-09-06

## 2022-09-06 VITALS
OXYGEN SATURATION: 100 % | HEIGHT: 60 IN | BODY MASS INDEX: 28.66 KG/M2 | WEIGHT: 146 LBS | DIASTOLIC BLOOD PRESSURE: 88 MMHG | RESPIRATION RATE: 16 BRPM | TEMPERATURE: 97.3 F | HEART RATE: 57 BPM | SYSTOLIC BLOOD PRESSURE: 135 MMHG

## 2022-09-06 PROCEDURE — 93306 TTE W/DOPPLER COMPLETE: CPT

## 2022-09-06 PROCEDURE — 93000 ELECTROCARDIOGRAM COMPLETE: CPT

## 2022-09-06 PROCEDURE — 99214 OFFICE O/P EST MOD 30 MIN: CPT | Mod: 25

## 2022-09-06 NOTE — ASSESSMENT
[FreeTextEntry1] : This is a 51-year-old female with past medical history significant for hypercholesterolemia, who comes in for cardiac follow-up evaluation and stress test. Today, she is feeling well and denies chest pain, shortness of breath, dizziness or syncope.   \par She does get occasional dyspnea on exertion.  She has no history of rheumatic fever.  She does not drink excessive caffeine or alcohol.\par \par Her cardiac risk factors include hypercholesterolemia, and positive family history of heart disease (her father had coronary artery disease and ultimately had a CABG in his 70s, and mother had hypercholesterolemia).  She has 4 children including a set of boy twins, and 1 boy had a hypoplastic heart syndrome and had 3 surgeries at [facility].\par \par She is here today for follow-up blood pressure and had a 24-hour blood pressure monitor placed on June 14, 2022 which demonstrated stage I high blood pressure during the night and stage II high blood pressure during the day.  She was supposed to have a sleep study placed however decided not to proceed.  She is currently only on rosuvastatin 20 mg daily for cholesterol management.\par \par She states that she has been under a lot of stress recently due to the recent passing of her mother and problems with her children. She does not know if she has sleep apnea but may have been told by her  that she does snore.  \par \par BLOOD PRESSURE:\par -BP is borderline elevated on today's exam.\par \par -I have discussed the importance of maintaining good BP control and reviewed the newest guidelines with the patient while re-enforcing dietary sodium restrictions to no more than 2-3 g daily, DASH diet, life style modifications as well as the goal of maintaining ideal body weight with the patient today. I have advised the patient to avoid the use of over-the-counter medications/ supplements especially NSAIDS.\par \par SLEEP APNEA ASSESSMENT:\par She also gives a history of occasional fatigue during the day, and snoring at nighttime. She  may have had an isolated apneic episode. The patient will schedule a sleep study to r/o sleep apnea which may be contributing to their HTN. A detailed explanation of the use of the machinery, the risks and benefits of sleep apnea were explained to the patient.  She  has a good understanding of utilization of the machinery and further recommendation will be made after the results of sleep study are available for review\par \par Sleep apnea is associated with adverse clinical consequences which an affect most organ systems. Cardiovascular disease risk includes arrhythmias, atrial fibrillation, hypertension, coronary artery disease, and stroke. Metabolic disorders include diabetes type 2, non-alcoholic fatty liver disease. Mood disorder especially depression; and cognitive decline especially in the elderly. Associations with chronic reflux/Mckeon’s esophagus some but not all inclusive. Reasons include arousal consistent with hypopnea; respiratory events most prominent in REM sleep or supine position; therefore sleep staging and body position are important for accurate diagnosis and estimation of AHI. \par \par BLOOD WORK:\par -New blood work was done 09/06/2022  to evaluate lipid profile, CBC, BMP, hepatic function, A1C and TSH. \par -Blood work done June 7, 2022 demonstrated cholesterol of 145, triglycerides of 206, HDL 50 and LDL of 64.\par -Blood work was done 4/29/2021 which demonstrated cholesterol of 191 and LDL of 108.\par -She remains on Rosuvastatin 20mg PO DAILY.\par \par CHOLESTEROL CONTROL:\par -Patient will continue the advised  TLC diet and to continue follow-up for treatment of hyperlipidemia and repeat blood testing with diet and exercise. I have discussed different exercises and the importance of maintenance of optimal body weight. The importance of staying within guidelines and recommendations was stressed to the patient today and they acknowledged that they understand this to me verbally.\par \par  -Ms. MILLAN was educated and advised that failure to follow-up with my medical recommendations to lower cholesterol can result in severe health consequences therefore, they will continuing a low saturated and low fat diet and to avoid excessive carbohydrates to help reduce triglycerides and that lowering LDL levels is associated with a significant decrease in serious cardiac events including but not limited to heart attack stroke and overall death. We will continue lipid lowering agents as advised based on blood test results and the patient understands to call if they develop severe muscle discomfort or if they have a reddish tinted discoloration in their urine.\par \par TESTING/REPORTS:\par -EKG done 09/06/2022 which demonstrated regular sinus rhythm with nonspecific ST-T wave changes BPM of 57.\par \par -Echocardiogram done 9/6/2022 and results are pending.\par \par -EKG done 07/05/2022 which demonstrated regular sinus rhythm with nonspecific ST-T wave changes BPM of 70.\par \par -24-hour blood pressure monitor done June 14, 2022 demonstrated stage I hypertension in the night and stage II hypertension during the day.\par \par -The patient had a normal exercise stress test June 7, 2022. The patient had a normal blood pressure response to exercise.\par \par -EKG done May 03, 2021 which demonstrated regular sinus rhythm with nonspecific ST-T wave changes, BPM of 75.\par \par -The patient had an echocardiogram done on 2/22/2021 demonstrated mild mitral tricuspid and pulmonic regurgitation with normal left ventricular systolic function. \par -EF on echo reported to be 65%.\par \par -The patient had a normal exercise stress test done on February 22, 2021.\par \par PLAN:\par -At this time she does not wish to be on any medication for her blood pressure.  She is also refusing to do a sleep study to evaluate for sleep apnea despite my recommendations.\par -We will reevaluate her blood pressure during her next follow-up appointment and if it still remains elevated then we will discuss adding an ARB or diuretic for better control.\par -She will continue with her her current medications and will contact the office if she is having any complaints between now and their next follow up appointment.\par -She will follow-up in 3 months\par \par I have discussed the plan of care with Ms. ROBERTO MILLAN. She is compliant with all of her medications.\par \par The patient understands that aerobic exercises must be increased to minutes 4 times/week and a detailed discussion of lifestyle modification was done today. \par The patient has a good understanding of the diagnosis, treatment plan and lifestyle modification. \par She will contact me at the office for any questions with their care or any changes in their health status.\par \par The plan of care was discussed with supervising physician, Dr. Archer while present in the office at the time of the visit. \par \par Caleb MEHTA

## 2022-09-06 NOTE — DISCUSSION/SUMMARY
[FreeTextEntry1] : Dr. Archer-(PRIOR VISIT and PMH WITH Dr. Archer): \par This is a 51-year-old female with past medical history significant for hypercholesterolemia, who comes in for cardiac follow-up evaluation.  She denies chest pain, shortness of breath, dizziness or syncope.   \par She does get occasional dyspnea on exertion.  She has no history of rheumatic fever.  She does not drink excessive caffeine or alcohol.\par \par Her cardiac risk factors include hypercholesterolemia, and positive family history of heart disease (her father had coronary artery disease and ultimately had a CABG in his 70s, and mother had hypercholesterolemia).  She has 4 children including a set of boy twins, and 1 boy had a hypoplastic heart syndrome and had 3 surgeries at [facility].\par \par The patient had a normal exercise stress test June 7, 2022.\par The patient had a normal blood pressure response to exercise.\par \par She will schedule a 24-hour ambulatory blood pressure monitor to evaluate her blood pressure throughout a 24-hour window.\par \par Echo Doppler examination done February 22, 2021 demonstrated minimal mitral valve regurgitation, mild tricuspid valve regurgitation physiologic pulmonic valve regurgitation with a normal ejection fraction of 64 to 65%.\par \par The patient had normal exercise stress test February 22, 2021.\par \par Electrocardiogram done January 19, 2021 demonstrates normal sinus rhythm rate 71 bpm is otherwise remarkable for nonspecific ST wave changes.\par \par Blood work done March 21, 2022 with her primary care physician demonstrated cholesterol 187, HDL of 57, triglycerides 194, LDL calculated 99 mg/dL and non-HDL cholesterol 130 mg/dL with a hemoglobin A1c of 5.5.  She had normal liver function test.\par The patient been stable on Crestor 20 mg daily.\par \par She reports that her blood pressure was elevated when she was at her gynecologist.\par She prefers to remain off medication and wishes lifestyle change.\par She will continue on her current diet and exercise program.\par \par Blood work done October 25 off Crestor therapy demonstrated total cholesterol 263, direct LDL cholesterol 174, HDL 47, triglycerides 189, non-HDL cholesterol 216 mg/dL.\par \par The patient had blood work done January 5, 2021 which demonstrated a potassium of 4.0, total cholesterol of 248, HDL of 46, triglycerides of 215, calculated LDL cholesterol 164 mg/dL, non-HDL cholesterol 202 mg/dL.  Hemoglobin A1c was 4.7.\par The patient may also benefit from coronary artery calcium score to determine her enhanced risk.\par \par The patient understands that aerobic exercises must be increased to 40 minutes 4 times per week. A detailed discussion of lifestyle modification was done today. The patient has a good understanding of the diagnosis, and treatment plan. Lifestyle modification was also outlined.

## 2022-09-08 ENCOUNTER — EMERGENCY (EMERGENCY)
Facility: HOSPITAL | Age: 52
LOS: 1 days | Discharge: HOME | End: 2022-09-08
Attending: EMERGENCY MEDICINE
Payer: COMMERCIAL

## 2022-09-08 VITALS
RESPIRATION RATE: 16 BRPM | HEART RATE: 70 BPM | OXYGEN SATURATION: 100 % | DIASTOLIC BLOOD PRESSURE: 85 MMHG | SYSTOLIC BLOOD PRESSURE: 142 MMHG | TEMPERATURE: 98 F

## 2022-09-08 VITALS
WEIGHT: 147.05 LBS | RESPIRATION RATE: 18 BRPM | HEART RATE: 66 BPM | HEIGHT: 60 IN | TEMPERATURE: 98 F | OXYGEN SATURATION: 100 % | SYSTOLIC BLOOD PRESSURE: 144 MMHG | DIASTOLIC BLOOD PRESSURE: 84 MMHG

## 2022-09-08 DIAGNOSIS — Z98.890 OTHER SPECIFIED POSTPROCEDURAL STATES: Chronic | ICD-10-CM

## 2022-09-08 PROCEDURE — 90715 TDAP VACCINE 7 YRS/> IM: CPT

## 2022-09-08 PROCEDURE — 12001 RPR S/N/AX/GEN/TRNK 2.5CM/<: CPT

## 2022-09-08 PROCEDURE — 90471 IMMUNIZATION ADMIN: CPT

## 2022-09-08 PROCEDURE — 99283 EMERGENCY DEPT VISIT LOW MDM: CPT | Mod: 25

## 2022-09-08 PROCEDURE — 73130 X-RAY EXAM OF HAND: CPT | Mod: 26,RT

## 2022-09-08 PROCEDURE — 73130 X-RAY EXAM OF HAND: CPT

## 2022-09-08 RX ORDER — ACETAMINOPHEN 500 MG
975 TABLET ORAL ONCE
Refills: 0 | Status: COMPLETED | OUTPATIENT
Start: 2022-09-08 | End: 2022-09-08

## 2022-09-08 RX ORDER — TETANUS TOXOID, REDUCED DIPHTHERIA TOXOID AND ACELLULAR PERTUSSIS VACCINE, ADSORBED 5; 2.5; 8; 8; 2.5 [IU]/.5ML; [IU]/.5ML; UG/.5ML; UG/.5ML; UG/.5ML
0.5 SUSPENSION INTRAMUSCULAR ONCE
Refills: 0 | Status: COMPLETED | OUTPATIENT
Start: 2022-09-08 | End: 2022-09-08

## 2022-09-08 RX ADMIN — TETANUS TOXOID, REDUCED DIPHTHERIA TOXOID AND ACELLULAR PERTUSSIS VACCINE, ADSORBED 0.5 MILLILITER(S): 5; 2.5; 8; 8; 2.5 SUSPENSION INTRAMUSCULAR at 02:09

## 2022-09-08 RX ADMIN — Medication 975 MILLIGRAM(S): at 02:04

## 2022-09-08 NOTE — ED PROCEDURE NOTE - ATTENDING CONTRIBUTION TO CARE
I was physically present for the E/M service provided. I was physically present for the key portions of the service provided. LEANNE.

## 2022-09-08 NOTE — ED PROVIDER NOTE - PATIENT PORTAL LINK FT
You can access the FollowMyHealth Patient Portal offered by [facility] by registering at the following website: [email or web address]. By joining [facility]’s FollowMyHealth portal, you will also be able to view your health information using other applications (apps) compatible with our system.

## 2022-09-08 NOTE — ED PROVIDER NOTE - OBJECTIVE STATEMENT
51F presents with lac to R middle finger. Pt was reaching for something in her closet when she cut her hand on a piece of glass, pt removed a small piece of glass from the wound. No sensation or motor loss. Last tetanus unknown.

## 2022-09-08 NOTE — ED PROVIDER NOTE - CLINICAL SUMMARY MEDICAL DECISION MAKING FREE TEXT BOX
51F presents with lac to R middle finger. Pt was reaching for something in her closet when she cut her hand on a piece of glass, pt removed a small piece of glass from the wound. No sensation or motor loss. Last tetanus unknown. AVSS, 3 mm laceration with adjacent abrasion to dorsal aspect of R hand 3rd digit. Laceration, r/o foreign body with xray, will give tetanus booster, irrigate wound give pain meds and reassess.

## 2022-09-08 NOTE — ED PROVIDER NOTE - PHYSICAL EXAMINATION
GENERAL: well appearing in no acute distress, non-toxic appearing  NEURO: no focal motor or sensory deficits  SKIN: well-perfused, 3 mm laceration with adjacent abrasion to dorsal aspect of R hand 3rd digit.   PSYCH: appropriate mood and affect

## 2022-09-08 NOTE — ED ADULT NURSE NOTE - OBJECTIVE STATEMENT
Pt is 51Y F, pmhx HLD, c/o laceration to right middle finger this evening. Pt states she was searching in a drawer and cut her middle knuckle on a piece of glass or sharp object. Pt endorses burning sensation and pain in finger, wrapped in gauze and bandaid to stop bleeding. Pt denies any numbness or tingling in extremity and has full ROM. Pt denies any SOB, chest pain, NVD, or any other symptoms. Pt is A&Ox4, ambulatory, comfort and safety secured, updated on plan of care

## 2022-09-08 NOTE — ED PROVIDER NOTE - ATTENDING CONTRIBUTION TO CARE
right 3rd digit small <0.5 cm flap MIP, able to flex/ex FROM active, no fb, wound care and splint per note.

## 2022-09-08 NOTE — ED ADULT NURSE NOTE - NSIMPLEMENTINTERV_GEN_ALL_ED
Implemented All Universal Safety Interventions:  [place] to call system. Call bell, personal items and telephone within reach. Instruct patient to call for assistance. Room bathroom lighting operational. Non-slip footwear when patient is off stretcher. Physically safe environment: no spills, clutter or unnecessary equipment. Stretcher in lowest position, wheels locked, appropriate side rails in place.

## 2022-09-08 NOTE — ED PROVIDER NOTE - CROS ED NEURO ALL NEG
[No studies available for review at this time.] : No studies available for review at this time.
negative...

## 2022-11-22 ENCOUNTER — LABORATORY RESULT (OUTPATIENT)
Age: 52
End: 2022-11-22

## 2022-11-22 ENCOUNTER — APPOINTMENT (OUTPATIENT)
Dept: CARDIOLOGY | Facility: CLINIC | Age: 52
End: 2022-11-22

## 2022-11-22 VITALS
HEART RATE: 82 BPM | SYSTOLIC BLOOD PRESSURE: 125 MMHG | OXYGEN SATURATION: 99 % | RESPIRATION RATE: 16 BRPM | HEIGHT: 60 IN | DIASTOLIC BLOOD PRESSURE: 77 MMHG | TEMPERATURE: 98.2 F | WEIGHT: 143 LBS | BODY MASS INDEX: 28.07 KG/M2

## 2022-11-22 PROCEDURE — 99214 OFFICE O/P EST MOD 30 MIN: CPT

## 2022-11-22 NOTE — PHYSICAL EXAM
[Well Developed] : well developed [Well Nourished] : well nourished [No Acute Distress] : no acute distress [Normal Venous Pressure] : normal venous pressure [No Carotid Bruit] : no carotid bruit [Normal S1, S2] : normal S1, S2 [No Rub] : no rub [I] : a grade 1 [Clear Lung Fields] : clear lung fields [Good Air Entry] : good air entry [No Respiratory Distress] : no respiratory distress  [Soft] : abdomen soft [Non Tender] : non-tender [No Masses/organomegaly] : no masses/organomegaly [Normal Bowel Sounds] : normal bowel sounds [Normal Gait] : normal gait [No Edema] : no edema [No Cyanosis] : no cyanosis [No Clubbing] : no clubbing [No Varicosities] : no varicosities [No Rash] : no rash [No Skin Lesions] : no skin lesions [Moves all extremities] : moves all extremities [No Focal Deficits] : no focal deficits [Normal Speech] : normal speech [Alert and Oriented] : alert and oriented [Normal memory] : normal memory [General Appearance - Well Developed] : well developed [Normal Appearance] : normal appearance [Well Groomed] : well groomed [General Appearance - Well Nourished] : well nourished [No Deformities] : no deformities [General Appearance - In No Acute Distress] : no acute distress [Normal Conjunctiva] : the conjunctiva exhibited no abnormalities [Normal Oral Mucosa] : normal oral mucosa [Normal Jugular Venous A Waves Present] : normal jugular venous A waves present [Normal Jugular Venous V Waves Present] : normal jugular venous V waves present [No Jugular Venous Marinelli A Waves] : no jugular venous marinelli A waves [Respiration, Rhythm And Depth] : normal respiratory rhythm and effort [Exaggerated Use Of Accessory Muscles For Inspiration] : no accessory muscle use [Auscultation Breath Sounds / Voice Sounds] : lungs were clear to auscultation bilaterally [Bowel Sounds] : normal bowel sounds [Abdomen Soft] : soft [Abnormal Walk] : normal gait [Gait - Sufficient For Exercise Testing] : the gait was sufficient for exercise testing [Nail Clubbing] : no clubbing of the fingernails [Cyanosis, Localized] : no localized cyanosis [Skin Color & Pigmentation] : normal skin color and pigmentation [Skin Turgor] : normal skin turgor [] : no rash [Oriented To Time, Place, And Person] : oriented to person, place, and time [Affect] : the affect was normal [Mood] : the mood was normal [No Anxiety] : not feeling anxious [5th Left ICS - MCL] : palpated at the 5th LICS in the midclavicular line [Normal] : normal [No Precordial Heave] : no precordial heave was noted [Normal Rate] : normal [Rhythm Regular] : regular [Normal S1] : normal S1 [Normal S2] : normal S2 [No Gallop] : no gallop heard [S3] : no S3 [S4] : no S4 [Click] : no click [Pericardial Rub] : no pericardial rub [II] : a grade 2 [Right Carotid Bruit] : no bruit heard over the right carotid [Left Carotid Bruit] : no bruit heard over the left carotid [Right Femoral Bruit] : no bruit heard over the right femoral artery [Left Femoral Bruit] : no bruit heard over the left femoral artery [2+] : left 2+ [No Abnormalities] : the abdominal aorta was not enlarged and no bruit was heard [No Pitting Edema] : no pitting edema present

## 2022-11-22 NOTE — DISCUSSION/SUMMARY
[FreeTextEntry1] :  This is a 52-year-old female with past medical history significant for hypercholesterolemia, who comes in for cardiac follow-up evaluation.  She denies chest pain, shortness of breath, dizziness or syncope.   \par She does get occasional dyspnea on exertion.  She has no history of rheumatic fever.  She does not drink excessive caffeine or alcohol.\par Her cardiac risk factors include hypercholesterolemia, and positive family history of heart disease (her father had coronary artery disease and ultimately had a CABG in his 70s, and mother had hypercholesterolemia).  She has 4 children including a set of boy twins, and 1 boy had a hypoplastic heart syndrome and had 3 surgeries at [facility].\par The patient had a lot of personal strife losing her parents within a 4-month window, and during this time she discontinued Crestor therapy.\par She will have new blood work done today for lipid panel, electrolytes, and SMA-20.  Further recommendations were made at that time.\par Blood work done September 6, 2022 demonstrated cholesterol 165, HDL of 43, triglycerides of 227, LDL direct of 88 mg/dL and non-HDL cholesterol 122 mg/dL with hemoglobin A1c of 5.4.\par I once again recommended she restart the Crestor therapy.  She does not wish to do so pending the results of today's blood test for lipid profile.\par I have asked her to make an appoint with our registered dietitian to make some subtle improvements in her weekly diet.\par She is currently hemodynamically stable and asymptomatic from a cardiac standpoint.\par The patient had a normal exercise stress test June 7, 2022.\par The patient had a normal blood pressure response to exercise.\par \par Echo Doppler examination done February 22, 2021 demonstrated minimal mitral valve regurgitation, mild tricuspid valve regurgitation physiologic pulmonic valve regurgitation with a normal ejection fraction of 64 to 65%.\par \par The patient had normal exercise stress test February 22, 2021.\par \par Electrocardiogram done January 19, 2021 demonstrates normal sinus rhythm rate 71 bpm is otherwise remarkable for nonspecific ST wave changes.\par \par Blood work done March 21, 2022 with her primary care physician demonstrated cholesterol 187, HDL of 57, triglycerides 194, LDL calculated 99 mg/dL and non-HDL cholesterol 130 mg/dL with a hemoglobin A1c of 5.5.  She had normal liver function test.\par The patient been stable on Crestor 20 mg daily.\par \par She reports that her blood pressure was elevated when she was at her gynecologist.\par She prefers to remain off medication and wishes lifestyle change.\par She will continue on her current diet and exercise program.\par \par Blood work done October 25 off Crestor therapy demonstrated total cholesterol 263, direct LDL cholesterol 174, HDL 47, triglycerides 189, non-HDL cholesterol 216 mg/dL.\par \par The patient had blood work done January 5, 2021 which demonstrated a potassium of 4.0, total cholesterol of 248, HDL of 46, triglycerides of 215, calculated LDL cholesterol 164 mg/dL, non-HDL cholesterol 202 mg/dL.  Hemoglobin A1c was 4.7.\par \par The patient understands that aerobic exercises must be increased to 40 minutes 4 times per week. A detailed discussion of lifestyle modification was done today. The patient has a good understanding of the diagnosis, and treatment plan. Lifestyle modification was also outlined.

## 2022-11-22 NOTE — ASSESSMENT
[FreeTextEntry1] : This is a 51-year-old female with past medical history significant for hypercholesterolemia, who comes in for cardiac follow-up evaluation and stress test. Today, she is feeling well and denies chest pain, shortness of breath, dizziness or syncope.   \par She does get occasional dyspnea on exertion.  She has no history of rheumatic fever.  She does not drink excessive caffeine or alcohol.\par \par Her cardiac risk factors include hypercholesterolemia, and positive family history of heart disease (her father had coronary artery disease and ultimately had a CABG in his 70s, and mother had hypercholesterolemia).  She has 4 children including a set of boy twins, and 1 boy had a hypoplastic heart syndrome and had 3 surgeries at [facility].\par \par She is here today for follow-up blood pressure and had a 24-hour blood pressure monitor placed on June 14, 2022 which demonstrated stage I high blood pressure during the night and stage II high blood pressure during the day.  She was supposed to have a sleep study placed however decided not to proceed.  She is currently only on rosuvastatin 20 mg daily for cholesterol management.\par \par She states that she has been under a lot of stress recently due to the recent passing of her mother and problems with her children. She does not know if she has sleep apnea but may have been told by her  that she does snore.  \par \par BLOOD PRESSURE:\par -BP is borderline elevated on today's exam.\par \par -I have discussed the importance of maintaining good BP control and reviewed the newest guidelines with the patient while re-enforcing dietary sodium restrictions to no more than 2-3 g daily, DASH diet, life style modifications as well as the goal of maintaining ideal body weight with the patient today. I have advised the patient to avoid the use of over-the-counter medications/ supplements especially NSAIDS.\par \par SLEEP APNEA ASSESSMENT:\par She also gives a history of occasional fatigue during the day, and snoring at nighttime. She  may have had an isolated apneic episode. The patient will schedule a sleep study to r/o sleep apnea which may be contributing to their HTN. A detailed explanation of the use of the machinery, the risks and benefits of sleep apnea were explained to the patient.  She  has a good understanding of utilization of the machinery and further recommendation will be made after the results of sleep study are available for review\par \par Sleep apnea is associated with adverse clinical consequences which an affect most organ systems. Cardiovascular disease risk includes arrhythmias, atrial fibrillation, hypertension, coronary artery disease, and stroke. Metabolic disorders include diabetes type 2, non-alcoholic fatty liver disease. Mood disorder especially depression; and cognitive decline especially in the elderly. Associations with chronic reflux/Mckeon’s esophagus some but not all inclusive. Reasons include arousal consistent with hypopnea; respiratory events most prominent in REM sleep or supine position; therefore sleep staging and body position are important for accurate diagnosis and estimation of AHI. \par \par BLOOD WORK:\par -New blood work was done 09/06/2022  to evaluate lipid profile, CBC, BMP, hepatic function, A1C and TSH. \par -Blood work done June 7, 2022 demonstrated cholesterol of 145, triglycerides of 206, HDL 50 and LDL of 64.\par -Blood work was done 4/29/2021 which demonstrated cholesterol of 191 and LDL of 108.\par -She remains on Rosuvastatin 20mg PO DAILY.\par \par CHOLESTEROL CONTROL:\par -Patient will continue the advised  TLC diet and to continue follow-up for treatment of hyperlipidemia and repeat blood testing with diet and exercise. I have discussed different exercises and the importance of maintenance of optimal body weight. The importance of staying within guidelines and recommendations was stressed to the patient today and they acknowledged that they understand this to me verbally.\par \par  -Ms. MILLAN was educated and advised that failure to follow-up with my medical recommendations to lower cholesterol can result in severe health consequences therefore, they will continuing a low saturated and low fat diet and to avoid excessive carbohydrates to help reduce triglycerides and that lowering LDL levels is associated with a significant decrease in serious cardiac events including but not limited to heart attack stroke and overall death. We will continue lipid lowering agents as advised based on blood test results and the patient understands to call if they develop severe muscle discomfort or if they have a reddish tinted discoloration in their urine.\par \par TESTING/REPORTS:\par -EKG done 09/06/2022 which demonstrated regular sinus rhythm with nonspecific ST-T wave changes BPM of 57.\par \par -Echocardiogram done 9/6/2022 and results are pending.\par \par -EKG done 07/05/2022 which demonstrated regular sinus rhythm with nonspecific ST-T wave changes BPM of 70.\par \par -24-hour blood pressure monitor done June 14, 2022 demonstrated stage I hypertension in the night and stage II hypertension during the day.\par \par -The patient had a normal exercise stress test June 7, 2022. The patient had a normal blood pressure response to exercise.\par \par -EKG done May 03, 2021 which demonstrated regular sinus rhythm with nonspecific ST-T wave changes, BPM of 75.\par \par -The patient had an echocardiogram done on 2/22/2021 demonstrated mild mitral tricuspid and pulmonic regurgitation with normal left ventricular systolic function. \par -EF on echo reported to be 65%.\par \par -The patient had a normal exercise stress test done on February 22, 2021.\par \par PLAN:\par -At this time she does not wish to be on any medication for her blood pressure.  She is also refusing to do a sleep study to evaluate for sleep apnea despite my recommendations.\par -We will reevaluate her blood pressure during her next follow-up appointment and if it still remains elevated then we will discuss adding an ARB or diuretic for better control.\par -She will continue with her her current medications and will contact the office if she is having any complaints between now and their next follow up appointment.\par -She will follow-up in 3 months\par \par I have discussed the plan of care with Ms. ROBERTO MILLAN. She is compliant with all of her medications.\par \par The patient understands that aerobic exercises must be increased to minutes 4 times/week and a detailed discussion of lifestyle modification was done today. \par The patient has a good understanding of the diagnosis, treatment plan and lifestyle modification. \par She will contact me at the office for any questions with their care or any changes in their health status.\par \par The plan of care was discussed with supervising physician, Dr. Archer while present in the office at the time of the visit. \par \par Caleb MEHTA

## 2022-11-28 ENCOUNTER — NON-APPOINTMENT (OUTPATIENT)
Age: 52
End: 2022-11-28

## 2022-11-28 RX ORDER — MAGNESIUM OXIDE/MAG AA CHELATE 300 MG
CAPSULE ORAL
Refills: 0 | Status: DISCONTINUED | COMMUNITY
End: 2022-11-28

## 2022-11-28 RX ORDER — PNV NO.95/FERROUS FUM/FOLIC AC 28MG-0.8MG
TABLET ORAL
Refills: 0 | Status: DISCONTINUED | COMMUNITY
End: 2022-11-28

## 2022-11-28 RX ORDER — CHOLECALCIFEROL (VITAMIN D3) 25 MCG
TABLET ORAL
Refills: 0 | Status: DISCONTINUED | COMMUNITY
End: 2022-11-28

## 2022-12-12 ENCOUNTER — APPOINTMENT (OUTPATIENT)
Dept: CARDIOLOGY | Facility: CLINIC | Age: 52
End: 2022-12-12

## 2023-02-27 ENCOUNTER — APPOINTMENT (OUTPATIENT)
Dept: OBGYN | Facility: CLINIC | Age: 53
End: 2023-02-27
Payer: COMMERCIAL

## 2023-02-27 VITALS
WEIGHT: 150 LBS | SYSTOLIC BLOOD PRESSURE: 147 MMHG | DIASTOLIC BLOOD PRESSURE: 85 MMHG | BODY MASS INDEX: 29.45 KG/M2 | HEIGHT: 60 IN

## 2023-02-27 VITALS — DIASTOLIC BLOOD PRESSURE: 95 MMHG | SYSTOLIC BLOOD PRESSURE: 152 MMHG

## 2023-02-27 DIAGNOSIS — R92.2 INCONCLUSIVE MAMMOGRAM: ICD-10-CM

## 2023-02-27 PROCEDURE — 99396 PREV VISIT EST AGE 40-64: CPT

## 2023-02-27 NOTE — PHYSICAL EXAM
[Chaperone Present] : A chaperone was present in the examining room during all aspects of the physical examination [Appropriately responsive] : appropriately responsive [Alert] : alert [No Acute Distress] : no acute distress [No Lymphadenopathy] : no lymphadenopathy [Soft] : soft [Non-tender] : non-tender [Non-distended] : non-distended [No HSM] : No HSM [No Lesions] : no lesions [No Mass] : no mass [Oriented x3] : oriented x3 [Examination Of The Breasts] : a normal appearance [No Masses] : no breast masses were palpable [Labia Majora] : normal [Labia Minora] : normal [Normal] : normal [Uterine Adnexae] : normal [FreeTextEntry1] : Bailey

## 2023-02-27 NOTE — END OF VISIT
[FreeTextEntry3] : I Primitivo Villalobos, acted as a scribe on behalf of Dr. Tim Mendoza on 02/27/2023.\par \par All medical entries made by this scribe where at my Dr. Tim Mendoza, direction and personally dictated by me on 02/27/2023.  I have reviewed the chart and agree that the record accurately reflects my personal performance of the history, physical exam, assessment, and plan. I have also personally directed, reviewed and agreed with the chart.

## 2023-02-27 NOTE — PLAN
[FreeTextEntry1] : 51 yo presents for annual visit. \par \par -pap smear today\par -rx mammo and sono\par -colonoscopy advised\par -Advised to f/u with Cardiologist Dr. Archer \par -rx pelvic sono after period since now change in menses-pertmenopause\par -rto 1 year

## 2023-02-28 ENCOUNTER — APPOINTMENT (OUTPATIENT)
Dept: OBGYN | Facility: CLINIC | Age: 53
End: 2023-02-28
Payer: COMMERCIAL

## 2023-02-28 ENCOUNTER — ASOB RESULT (OUTPATIENT)
Age: 53
End: 2023-02-28

## 2023-02-28 PROCEDURE — 76856 US EXAM PELVIC COMPLETE: CPT

## 2023-02-28 PROCEDURE — 76830 TRANSVAGINAL US NON-OB: CPT

## 2023-03-01 LAB
CYTOLOGY CVX/VAG DOC THIN PREP: NORMAL
HPV HIGH+LOW RISK DNA PNL CVX: NOT DETECTED

## 2023-03-06 ENCOUNTER — NON-APPOINTMENT (OUTPATIENT)
Age: 53
End: 2023-03-06

## 2023-03-06 ENCOUNTER — LABORATORY RESULT (OUTPATIENT)
Age: 53
End: 2023-03-06

## 2023-03-06 ENCOUNTER — APPOINTMENT (OUTPATIENT)
Dept: CARDIOLOGY | Facility: CLINIC | Age: 53
End: 2023-03-06
Payer: COMMERCIAL

## 2023-03-06 ENCOUNTER — RX CHANGE (OUTPATIENT)
Age: 53
End: 2023-03-06

## 2023-03-06 VITALS
DIASTOLIC BLOOD PRESSURE: 98 MMHG | WEIGHT: 150 LBS | TEMPERATURE: 98.2 F | HEIGHT: 60 IN | BODY MASS INDEX: 29.45 KG/M2 | SYSTOLIC BLOOD PRESSURE: 160 MMHG | OXYGEN SATURATION: 97 % | RESPIRATION RATE: 16 BRPM | HEART RATE: 64 BPM

## 2023-03-06 PROCEDURE — 93000 ELECTROCARDIOGRAM COMPLETE: CPT

## 2023-03-06 PROCEDURE — 99214 OFFICE O/P EST MOD 30 MIN: CPT | Mod: 25

## 2023-03-06 RX ORDER — ROSUVASTATIN CALCIUM 20 MG/1
20 TABLET, FILM COATED ORAL
Qty: 90 | Refills: 1 | Status: DISCONTINUED | COMMUNITY
Start: 2021-02-02 | End: 2023-03-06

## 2023-03-06 NOTE — DISCUSSION/SUMMARY
[FreeTextEntry1] :  Dr. Archer-(PRIOR VISIT and PMH WITH Dr. Archer): \par This is a 52-year-old female with past medical history significant for hypercholesterolemia, who comes in for cardiac follow-up evaluation.  She denies chest pain, shortness of breath, dizziness or syncope.   \par She does get occasional dyspnea on exertion.  She has no history of rheumatic fever.  She does not drink excessive caffeine or alcohol.\par \par Her cardiac risk factors include hypercholesterolemia, and positive family history of heart disease (her father had coronary artery disease and ultimately had a CABG in his 70s, and mother had hypercholesterolemia).  She has 4 children including a set of boy twins, and 1 boy had a hypoplastic heart syndrome and had 3 surgeries at [facility].\par \par The patient had a lot of personal strife losing her parents within a 4-month window, and during this time she discontinued Crestor therapy.\par She will have new blood work done today for lipid panel, electrolytes, and SMA-20.  Further recommendations were made at that time.\par \par Blood work done September 6, 2022 demonstrated cholesterol 165, HDL of 43, triglycerides of 227, LDL direct of 88 mg/dL and non-HDL cholesterol 122 mg/dL with hemoglobin A1c of 5.4.\par I once again recommended she restart the Crestor therapy.  She does not wish to do so pending the results of today's blood test for lipid profile.\par \par I have asked her to make an appoint with our registered dietitian to make some subtle improvements in her weekly diet.\par \par The patient had a normal exercise stress test June 7, 2022.\par \par The patient had a normal blood pressure response to exercise.\par \par Echo Doppler examination done February 22, 2021 demonstrated minimal mitral valve regurgitation, mild tricuspid valve regurgitation physiologic pulmonic valve regurgitation with a normal ejection fraction of 64 to 65%.\par \par The patient had normal exercise stress test February 22, 2021.\par \par Electrocardiogram done January 19, 2021 demonstrates normal sinus rhythm rate 71 bpm is otherwise remarkable for nonspecific ST wave changes.\par \par Blood work done March 21, 2022 with her primary care physician demonstrated cholesterol 187, HDL of 57, triglycerides 194, LDL calculated 99 mg/dL and non-HDL cholesterol 130 mg/dL with a hemoglobin A1c of 5.5.  She had normal liver function test.\par The patient been stable on Crestor 20 mg daily.\par \par She reports that her blood pressure was elevated when she was at her gynecologist.\par She prefers to remain off medication and wishes lifestyle change.\par She will continue on her current diet and exercise program.\par \par Blood work done October 25 off Crestor therapy demonstrated total cholesterol 263, direct LDL cholesterol 174, HDL 47, triglycerides 189, non-HDL cholesterol 216 mg/dL.\par \par The patient had blood work done January 5, 2021 which demonstrated a potassium of 4.0, total cholesterol of 248, HDL of 46, triglycerides of 215, calculated LDL cholesterol 164 mg/dL, non-HDL cholesterol 202 mg/dL.  Hemoglobin A1c was 4.7.\par \par The patient understands that aerobic exercises must be increased to 40 minutes 4 times per week. A detailed discussion of lifestyle modification was done today. The patient has a good understanding of the diagnosis, and treatment plan. Lifestyle modification was also outlined.

## 2023-03-06 NOTE — ASSESSMENT
[FreeTextEntry1] : This is a 52-year-old female with past medical history significant for hypercholesterolemia, who comes in for cardiac follow-up evaluation and stress test. Today, she is feeling well and denies chest pain, shortness of breath, dizziness or syncope.   \par She does get occasional dyspnea on exertion.  She has no history of rheumatic fever.  She does not drink excessive caffeine or alcohol.\par \par Her cardiac risk factors include hypercholesterolemia, and positive family history of heart disease (her father had coronary artery disease and ultimately had a CABG in his 70s, and mother had hypercholesterolemia).  She has 4 children including a set of boy twins, and 1 boy had a hypoplastic heart syndrome and had 3 surgeries at [facility].\par \par -She is currently prescribed rosuvastatin 20 mg daily however is not taking this medication.  She would really prefer not to be on a statin but understands she has elevated cholesterol.  She would also like to make a note that she has been noticing elevated blood pressure readings.  She has not done a sleep study as of yet although she was ordered for 1 last visit.  She states she does not remember why she had to cancel the appointment.\par \par PMH:\par She states that she has been under a lot of stress recently due to the recent passing of her mother and problems with her children. \par \par BLOOD PRESSURE:\par -BP is elevated on today's exam.\par \par -I have discussed the importance of maintaining good BP control and reviewed the newest guidelines with the patient while re-enforcing dietary sodium restrictions to no more than 2-3 g daily, DASH diet, life style modifications as well as the goal of maintaining ideal body weight with the patient today. I have advised the patient to avoid the use of over-the-counter medications/ supplements especially NSAIDS.\par \par SLEEP APNEA ASSESSMENT:\par She also gives a history of occasional fatigue during the day, and snoring at nighttime. She  may have had an isolated apneic episode. The patient will schedule a sleep study to r/o sleep apnea which may be contributing to their HTN. A detailed explanation of the use of the machinery, the risks and benefits of sleep apnea were explained to the patient.  She  has a good understanding of utilization of the machinery and further recommendation will be made after the results of sleep study are available for review\par \par Sleep apnea is associated with adverse clinical consequences which an affect most organ systems. Cardiovascular disease risk includes arrhythmias, atrial fibrillation, hypertension, coronary artery disease, and stroke. Metabolic disorders include diabetes type 2, non-alcoholic fatty liver disease. Mood disorder especially depression; and cognitive decline especially in the elderly. Associations with chronic reflux/Mckeon’s esophagus some but not all inclusive. Reasons include arousal consistent with hypopnea; respiratory events most prominent in REM sleep or supine position; therefore sleep staging and body position are important for accurate diagnosis and estimation of AHI. \par \par BLOOD WORK:\par -New blood work was done 03/06/2023  to evaluate lipid profile, CBC, BMP, hepatic function, A1C and TSH. \par \par -Blood work done November 22, 2022 demonstrated total cholesterol of 212.  .  Rosuvastatin 20 mg daily was added back at this time.\par -New blood work was done 09/06/2022 which demonstrated total cholesterol 165 and LDL 88.\par -Blood work done June 7, 2022 demonstrated cholesterol of 145, triglycerides of 206, HDL 50 and LDL of 64.\par -Blood work was done 4/29/2021 which demonstrated cholesterol of 191 and LDL of 108.\par -She remains on Rosuvastatin 20mg PO DAILY.\par \par CHOLESTEROL CONTROL:\par -Patient will continue the advised  TLC diet and to continue follow-up for treatment of hyperlipidemia and repeat blood testing with diet and exercise. I have discussed different exercises and the importance of maintenance of optimal body weight. The importance of staying within guidelines and recommendations was stressed to the patient today and they acknowledged that they understand this to me verbally.\par \par  -Ms. MILLAN was educated and advised that failure to follow-up with my medical recommendations to lower cholesterol can result in severe health consequences therefore, they will continuing a low saturated and low fat diet and to avoid excessive carbohydrates to help reduce triglycerides and that lowering LDL levels is associated with a significant decrease in serious cardiac events including but not limited to heart attack stroke and overall death. We will continue lipid lowering agents as advised based on blood test results and the patient understands to call if they develop severe muscle discomfort or if they have a reddish tinted discoloration in their urine.\par \par TESTING/REPORTS:\par -EKG done 03/06/2023 which demonstrated regular sinus rhythm with nonspecific ST-T wave changes BPM of 64.\par \par -EKG done 09/06/2022 which demonstrated regular sinus rhythm with nonspecific ST-T wave changes BPM of 57.\par \par -Echocardiogram done 9/6/2022 which demonstrated mild mitral annular calcification, minimal to mild mitral regurgitation, mild tricuspid regurgitation with physiologic pulmonic regurgitation and borderline pulmonary pressures with normal left ventricular systolic function ejection fraction 65%.\par \par -EKG done 07/05/2022 which demonstrated regular sinus rhythm with nonspecific ST-T wave changes BPM of 70.\par \par -24-hour blood pressure monitor done June 14, 2022 demonstrated stage I hypertension in the night and stage II hypertension during the day.\par \par -The patient had a normal exercise stress test June 7, 2022. The patient had a normal blood pressure response to exercise.\par \par -EKG done May 03, 2021 which demonstrated regular sinus rhythm with nonspecific ST-T wave changes, BPM of 75.\par \par -The patient had an echocardiogram done on 2/22/2021 demonstrated mild mitral tricuspid and pulmonic regurgitation with normal left ventricular systolic function. \par -EF on echo reported to be 65%.\par \par -The patient had a normal exercise stress test done on February 22, 2021.\par \par PLAN:\par -The patient will start telmisartan 80 mg daily for blood pressure management\par -Sleep study ordered to evaluate for sleep apnea.  We will discuss the results of the sleep study once received.\par -The patient will start Nexletol 180 mg daily for cholesterol management.  She prefers to not be on a statin at this time.\par -She will continue with her her current medications and will contact the office if she is having any complaints between now and their next follow up appointment.\par -She will follow-up in 4 to 6 weeks for blood pressure check.\par \par I have discussed the plan of care with Ms. ROBERTO MILLAN. She is compliant with all of her medications.\par \par The patient understands that aerobic exercises must be increased to minutes 4 times/week and a detailed discussion of lifestyle modification was done today. \par The patient has a good understanding of the diagnosis, treatment plan and lifestyle modification. \par She will contact me at the office for any questions with their care or any changes in their health status.\par \par The plan of care was discussed with supervising physician, Dr. Archer while present in the office at the time of the visit. \par \par Caleb NP

## 2023-03-07 ENCOUNTER — RX CHANGE (OUTPATIENT)
Age: 53
End: 2023-03-07

## 2023-03-07 ENCOUNTER — TRANSCRIPTION ENCOUNTER (OUTPATIENT)
Age: 53
End: 2023-03-07

## 2023-03-08 ENCOUNTER — NON-APPOINTMENT (OUTPATIENT)
Age: 53
End: 2023-03-08

## 2023-03-14 ENCOUNTER — NON-APPOINTMENT (OUTPATIENT)
Age: 53
End: 2023-03-14

## 2023-03-15 ENCOUNTER — NON-APPOINTMENT (OUTPATIENT)
Age: 53
End: 2023-03-15

## 2023-03-21 ENCOUNTER — NON-APPOINTMENT (OUTPATIENT)
Age: 53
End: 2023-03-21

## 2023-03-21 ENCOUNTER — APPOINTMENT (OUTPATIENT)
Dept: CARDIOLOGY | Facility: CLINIC | Age: 53
End: 2023-03-21

## 2023-03-30 ENCOUNTER — NON-APPOINTMENT (OUTPATIENT)
Age: 53
End: 2023-03-30

## 2023-04-06 ENCOUNTER — NON-APPOINTMENT (OUTPATIENT)
Age: 53
End: 2023-04-06

## 2023-04-17 ENCOUNTER — APPOINTMENT (OUTPATIENT)
Dept: CARDIOLOGY | Facility: CLINIC | Age: 53
End: 2023-04-17
Payer: COMMERCIAL

## 2023-04-17 VITALS
HEIGHT: 60 IN | RESPIRATION RATE: 16 BRPM | HEART RATE: 69 BPM | WEIGHT: 150 LBS | TEMPERATURE: 97.4 F | SYSTOLIC BLOOD PRESSURE: 120 MMHG | OXYGEN SATURATION: 99 % | DIASTOLIC BLOOD PRESSURE: 70 MMHG | BODY MASS INDEX: 29.45 KG/M2

## 2023-04-17 PROBLEM — R92.2 DENSE BREASTS: Status: ACTIVE | Noted: 2023-04-17

## 2023-04-17 PROCEDURE — 99214 OFFICE O/P EST MOD 30 MIN: CPT | Mod: 25

## 2023-04-17 PROCEDURE — 95806 SLEEP STUDY UNATT&RESP EFFT: CPT

## 2023-04-17 NOTE — DISCUSSION/SUMMARY
[FreeTextEntry1] :  Dr. Archer-(PRIOR VISIT and PMH WITH Dr. Archer): \par This is a 52-year-old female with past medical history significant for hypercholesterolemia, who comes in for cardiac follow-up evaluation.  She denies chest pain, shortness of breath, dizziness or syncope.   \par She does get occasional dyspnea on exertion.  She has no history of rheumatic fever.  She does not drink excessive caffeine or alcohol.\par \par Her cardiac risk factors include hypercholesterolemia, and positive family history of heart disease (her father had coronary artery disease and ultimately had a CABG in his 70s, and mother had hypercholesterolemia).  She has 4 children including a set of boy twins, and 1 boy had a hypoplastic heart syndrome and had 3 surgeries at [facility].\par \par The patient had a lot of personal strife losing her parents within a 4-month window, and during this time she discontinued Crestor therapy.\par She will have new blood work done today for lipid panel, electrolytes, and SMA-20.  Further recommendations were made at that time.\par \par Blood work done September 6, 2022 demonstrated cholesterol 165, HDL of 43, triglycerides of 227, LDL direct of 88 mg/dL and non-HDL cholesterol 122 mg/dL with hemoglobin A1c of 5.4.\par I once again recommended she restart the Crestor therapy.  She does not wish to do so pending the results of today's blood test for lipid profile.\par \par I have asked her to make an appoint with our registered dietitian to make some subtle improvements in her weekly diet.\par \par The patient had a normal exercise stress test June 7, 2022.\par \par The patient had a normal blood pressure response to exercise.\par \par Echo Doppler examination done February 22, 2021 demonstrated minimal mitral valve regurgitation, mild tricuspid valve regurgitation physiologic pulmonic valve regurgitation with a normal ejection fraction of 64 to 65%.\par \par The patient had normal exercise stress test February 22, 2021.\par \par Electrocardiogram done January 19, 2021 demonstrates normal sinus rhythm rate 71 bpm is otherwise remarkable for nonspecific ST wave changes.\par \par Blood work done March 21, 2022 with her primary care physician demonstrated cholesterol 187, HDL of 57, triglycerides 194, LDL calculated 99 mg/dL and non-HDL cholesterol 130 mg/dL with a hemoglobin A1c of 5.5.  She had normal liver function test.\par The patient been stable on Crestor 20 mg daily.\par \par She reports that her blood pressure was elevated when she was at her gynecologist.\par She prefers to remain off medication and wishes lifestyle change.\par She will continue on her current diet and exercise program.\par \par Blood work done October 25 off Crestor therapy demonstrated total cholesterol 263, direct LDL cholesterol 174, HDL 47, triglycerides 189, non-HDL cholesterol 216 mg/dL.\par \par The patient had blood work done January 5, 2021 which demonstrated a potassium of 4.0, total cholesterol of 248, HDL of 46, triglycerides of 215, calculated LDL cholesterol 164 mg/dL, non-HDL cholesterol 202 mg/dL.  Hemoglobin A1c was 4.7.\par \par The patient understands that aerobic exercises must be increased to 40 minutes 4 times per week. A detailed discussion of lifestyle modification was done today. The patient has a good understanding of the diagnosis, and treatment plan. Lifestyle modification was also outlined. - in the setting of poor PO intake and dehydration  -No further episodes   - s/p 3L IVF boluses  -Continue IVF -resolved  - in the setting of poor PO intake and dehydration  -No further episodes   - s/p 3L IVF boluses and IVF

## 2023-04-17 NOTE — ASSESSMENT
[FreeTextEntry1] : This is a 52-year-old female with past medical history significant for hypercholesterolemia, who comes in for cardiac follow-up evaluation and stress test. Today, she is feeling well and denies chest pain, shortness of breath, dizziness or syncope.   She does get occasional dyspnea on exertion.  She has no history of rheumatic fever.  She does not drink excessive caffeine or alcohol.\par \par Her cardiac risk factors include hypercholesterolemia, and positive family history of heart disease (her father had coronary artery disease and ultimately had a CABG in his 70s, and mother had hypercholesterolemia).  She has 4 children including a set of boy twins, and 1 boy had a hypoplastic heart syndrome and had 3 surgeries at [facility].\par \par He was prescribed Nexletol 180 mg 1 tablet daily and telmisartan 80 mg daily which was added last visit due to episodes of hypertension.  \par \par *She would like to make a note that she would eventually like to be on the lowest dose of medication as possible.  It was explained to the patient that we will reevaluate her blood pressures during her future follow-up appointments and may be able to decrease her telmisartan from 80 mg to 40 mg.  She is here today to also have a sleep study placed where we will evaluate for sleep apnea which may be contributing to elevated blood pressure readings.\par \par PMH:\par She states that she has been under a lot of stress recently due to the recent passing of her mother and problems with her children. \par \par BLOOD PRESSURE:\par -BP is better controlled on today's exam.\par \par SLEEP APNEA ASSESSMENT:\par She also gives a history of occasional fatigue during the day, and snoring at nighttime. She  may have had an isolated apneic episode. The patient will schedule a sleep study to r/o sleep apnea which may be contributing to their HTN. A detailed explanation of the use of the machinery, the risks and benefits of sleep apnea were explained to the patient.  She  has a good understanding of utilization of the machinery and further recommendation will be made after the results of sleep study are available for review\par \par Sleep apnea is associated with adverse clinical consequences which an affect most organ systems. Cardiovascular disease risk includes arrhythmias, atrial fibrillation, hypertension, coronary artery disease, and stroke. Metabolic disorders include diabetes type 2, non-alcoholic fatty liver disease. Mood disorder especially depression; and cognitive decline especially in the elderly. Associations with chronic reflux/Mckeon’s esophagus some but not all inclusive. Reasons include arousal consistent with hypopnea; respiratory events most prominent in REM sleep or supine position; therefore sleep staging and body position are important for accurate diagnosis and estimation of AHI. \par \par BLOOD WORK:\par -New blood work was done 03/06/2023 which demonstrated normal lipid profile.\par -Blood work done November 22, 2022 demonstrated total cholesterol of 212.  .  Rosuvastatin 20 mg daily was added back at this time.\par -New blood work was done 09/06/2022 which demonstrated total cholesterol 165 and LDL 88.\par -Blood work done June 7, 2022 demonstrated cholesterol of 145, triglycerides of 206, HDL 50 and LDL of 64.\par -Blood work was done 4/29/2021 which demonstrated cholesterol of 191 and LDL of 108.\par -She remains on Rosuvastatin 20mg PO DAILY.\par \par TESTING/REPORTS:\par -Sleep study placed 4/17/2023 and results are pending.\par \par -EKG done 03/06/2023 which demonstrated regular sinus rhythm with nonspecific ST-T wave changes BPM of 64.\par \par -EKG done 09/06/2022 which demonstrated regular sinus rhythm with nonspecific ST-T wave changes BPM of 57.\par \par -Echocardiogram done 9/6/2022 which demonstrated mild mitral annular calcification, minimal to mild mitral regurgitation, mild tricuspid regurgitation with physiologic pulmonic regurgitation and borderline pulmonary pressures with normal left ventricular systolic function ejection fraction 65%.\par \par -EKG done 07/05/2022 which demonstrated regular sinus rhythm with nonspecific ST-T wave changes BPM of 70.\par \par -24-hour blood pressure monitor done June 14, 2022 demonstrated stage I hypertension in the night and stage II hypertension during the day.\par \par -The patient had a normal exercise stress test June 7, 2022. The patient had a normal blood pressure response to exercise.\par \par -EKG done May 03, 2021 which demonstrated regular sinus rhythm with nonspecific ST-T wave changes, BPM of 75.\par \par -The patient had an echocardiogram done on 2/22/2021 demonstrated mild mitral tricuspid and pulmonic regurgitation with normal left ventricular systolic function. \par -EF on echo reported to be 65%.\par \par -The patient had a normal exercise stress test done on February 22, 2021.\par \par PLAN:\par -Sleep study ordered to evaluate for sleep apnea.  We will discuss the results of the sleep study once received.\par -The patient is clinically stable from a cardiac standpoint on today's exam.\par -She will continue with her her current medications and will contact the office if she is having any complaints between now and their next follow up appointment.\par -3-month follow-up.\par \par I have discussed the plan of care with Ms. ROBERTO MILLAN. She is compliant with all of her medications.\par \par The patient understands that aerobic exercises must be increased to minutes 4 times/week and a detailed discussion of lifestyle modification was done today. \par The patient has a good understanding of the diagnosis, treatment plan and lifestyle modification. \par She will contact me at the office for any questions with their care or any changes in their health status.\par \par The plan of care was discussed with supervising physician, Dr. Archer while present in the office at the time of the visit. \par \par Caleb MEHTA

## 2023-04-18 ENCOUNTER — RESULT REVIEW (OUTPATIENT)
Age: 53
End: 2023-04-18

## 2023-04-18 ENCOUNTER — APPOINTMENT (OUTPATIENT)
Dept: MAMMOGRAPHY | Facility: CLINIC | Age: 53
End: 2023-04-18
Payer: COMMERCIAL

## 2023-04-18 ENCOUNTER — APPOINTMENT (OUTPATIENT)
Dept: ULTRASOUND IMAGING | Facility: CLINIC | Age: 53
End: 2023-04-18
Payer: COMMERCIAL

## 2023-04-18 PROCEDURE — 77063 BREAST TOMOSYNTHESIS BI: CPT

## 2023-04-18 PROCEDURE — 77067 SCR MAMMO BI INCL CAD: CPT

## 2023-04-18 PROCEDURE — 76641 ULTRASOUND BREAST COMPLETE: CPT | Mod: 50

## 2023-05-02 ENCOUNTER — TRANSCRIPTION ENCOUNTER (OUTPATIENT)
Age: 53
End: 2023-05-02

## 2023-05-22 ENCOUNTER — APPOINTMENT (OUTPATIENT)
Dept: CARDIOLOGY | Facility: CLINIC | Age: 53
End: 2023-05-22

## 2023-06-07 DIAGNOSIS — N92.6 IRREGULAR MENSTRUATION, UNSPECIFIED: ICD-10-CM

## 2023-06-20 ENCOUNTER — OUTPATIENT (OUTPATIENT)
Dept: OUTPATIENT SERVICES | Facility: HOSPITAL | Age: 53
LOS: 1 days | End: 2023-06-20
Payer: COMMERCIAL

## 2023-06-20 ENCOUNTER — APPOINTMENT (OUTPATIENT)
Dept: ULTRASOUND IMAGING | Facility: CLINIC | Age: 53
End: 2023-06-20
Payer: COMMERCIAL

## 2023-06-20 DIAGNOSIS — N92.6 IRREGULAR MENSTRUATION, UNSPECIFIED: ICD-10-CM

## 2023-06-20 DIAGNOSIS — Z00.8 ENCOUNTER FOR OTHER GENERAL EXAMINATION: ICD-10-CM

## 2023-06-20 DIAGNOSIS — Z98.890 OTHER SPECIFIED POSTPROCEDURAL STATES: Chronic | ICD-10-CM

## 2023-06-20 DIAGNOSIS — N84.0 POLYP OF CORPUS UTERI: ICD-10-CM

## 2023-06-20 PROCEDURE — 76856 US EXAM PELVIC COMPLETE: CPT

## 2023-06-20 PROCEDURE — 76830 TRANSVAGINAL US NON-OB: CPT

## 2023-06-20 PROCEDURE — 76830 TRANSVAGINAL US NON-OB: CPT | Mod: 26

## 2023-06-20 PROCEDURE — 76856 US EXAM PELVIC COMPLETE: CPT | Mod: 26

## 2023-06-30 ENCOUNTER — ASOB RESULT (OUTPATIENT)
Age: 53
End: 2023-06-30

## 2023-06-30 ENCOUNTER — APPOINTMENT (OUTPATIENT)
Dept: OBGYN | Facility: CLINIC | Age: 53
End: 2023-06-30
Payer: COMMERCIAL

## 2023-06-30 DIAGNOSIS — R93.89 ABNORMAL FINDINGS ON DIAGNOSTIC IMAGING OF OTHER SPECIFIED BODY STRUCTURES: ICD-10-CM

## 2023-06-30 PROCEDURE — 76831 ECHO EXAM UTERUS: CPT

## 2023-06-30 PROCEDURE — 81025 URINE PREGNANCY TEST: CPT

## 2023-06-30 PROCEDURE — 58100 BIOPSY OF UTERUS LINING: CPT

## 2023-07-16 PROBLEM — R93.89 THICKENED ENDOMETRIUM: Status: ACTIVE | Noted: 2023-07-16

## 2023-07-16 LAB — CORE LAB BIOPSY: NORMAL

## 2023-07-16 NOTE — END OF VISIT
[FreeTextEntry3] : I, Jaxson Harman, acted as a scribe on behalf of Dr. Daniel Dill on 06/30/2023 .\par \par All medical entries made by the scribe were at my, Dr. Daniel Dill's, direction and personally dictated by me on 06/30/2023. I have reviewed the chart and agree that the record accurately reflects my personal performance of the history, physical exam, assessment and plan. I have also personally directed, reviewed, and agreed with the chart.

## 2023-07-16 NOTE — PROCEDURE
[Endometrial Biopsy] : Endometrial biopsy [Time out performed] : Pre-procedure time out performed.  Patient's name, date of birth and procedure confirmed. [Consent Obtained] : Consent obtained [Risks] : risks [Benefits] : benefits [Alternatives] : alternatives [Patient] : patient [Infection] : infection [Bleeding] : bleeding [Allergic Reaction] : allergic reaction [Uterine Perforation] : uterine perforation [Pain] : pain [Negative] : negative pregnancy test [Betadine] : Betadine [None] : none [Anteverted] : anteverted [Specimen Collected] : collected [Sent to Pathology] : placed in buffered formalin and sent for pathology [Tolerated Well] : Patient tolerated the procedure well [No Complications] : No complications [Saline Infusion Sonography] : saline infusion sonography [de-identified] : endometrial polyp [de-identified] : Dianne clamp [de-identified] : endometrial pipelle [FreeTextEntry9] : endometrial polyp [FreeTextEntry3] : Cervix prepped with Betadine, anterior lip grasped with Allis clamp, catheter inserted into uterine cavity and balloon inflated, speculum removed, transvaginal ultrasound probe inserted, uterus infused with sterile saline. Findings: No intracavitary fibroid or polyp. EMS 7mm.\par  [FreeTextEntry4] : No intracavitary fibroid or polyp. EMS 7mm.

## 2023-07-16 NOTE — ASSESSMENT
[FreeTextEntry1] : Pelvic US 6/20/23:\par Uterus: 8.5 cm x 4.5 cm x 5.1 cm. Within normal limits.\par Endometrium: 2 mm. Within normal limits.\par \par Right ovary: 2.1 cm x 1.4 cm x 2.3 cm. limited transabdominal views.\par Left ovary: 2.5 cm x 1.5 cm x 2.9 cm. limited transabdominal views.\par \par Fluid: None.\par \par IMPRESSION:\par Normal thickness endometrium without focal abnormality identified.\par

## 2023-07-16 NOTE — PLAN
[FreeTextEntry1] : 51 yo with irreg/skipping periods, h/o endometrial polyp s/p polypectomy 2021. likely perimenopause\par \par -SHG today, no complications\par -EMBx today, no complications\par -f/u Bx results\par -mesntrual calendar\par \par

## 2023-08-08 ENCOUNTER — NON-APPOINTMENT (OUTPATIENT)
Age: 53
End: 2023-08-08

## 2023-08-08 ENCOUNTER — APPOINTMENT (OUTPATIENT)
Dept: CARDIOLOGY | Facility: CLINIC | Age: 53
End: 2023-08-08
Payer: COMMERCIAL

## 2023-08-08 VITALS
SYSTOLIC BLOOD PRESSURE: 130 MMHG | OXYGEN SATURATION: 96 % | DIASTOLIC BLOOD PRESSURE: 80 MMHG | BODY MASS INDEX: 30.23 KG/M2 | HEIGHT: 60 IN | WEIGHT: 154 LBS | HEART RATE: 66 BPM | RESPIRATION RATE: 16 BRPM | TEMPERATURE: 98.4 F

## 2023-08-08 DIAGNOSIS — R40.0 SOMNOLENCE: ICD-10-CM

## 2023-08-08 DIAGNOSIS — R06.09 OTHER FORMS OF DYSPNEA: ICD-10-CM

## 2023-08-08 DIAGNOSIS — R06.81 APNEA, NOT ELSEWHERE CLASSIFIED: ICD-10-CM

## 2023-08-08 DIAGNOSIS — Z86.16 PERSONAL HISTORY OF COVID-19: ICD-10-CM

## 2023-08-08 DIAGNOSIS — R06.83 SNORING: ICD-10-CM

## 2023-08-08 PROCEDURE — 93000 ELECTROCARDIOGRAM COMPLETE: CPT

## 2023-08-08 PROCEDURE — 99214 OFFICE O/P EST MOD 30 MIN: CPT | Mod: 25

## 2023-08-08 NOTE — ASSESSMENT
[FreeTextEntry1] : Prior note nurse practitioner Jeana April 17, 2023:  This is a 52-year-old female with past medical history significant for hypercholesterolemia, who comes in for cardiac follow-up evaluation and stress test. Today, she is feeling well and denies chest pain, shortness of breath, dizziness or syncope. She does get occasional dyspnea on exertion.  She has no history of rheumatic fever.  She does not drink excessive caffeine or alcohol.  Her cardiac risk factors include hypercholesterolemia, and positive family history of heart disease (her father had coronary artery disease and ultimately had a CABG in his 70s, and mother had hypercholesterolemia).  She has 4 children including a set of boy twins, and 1 boy had a hypoplastic heart syndrome and had 3 surgeries at [facility].  She was prescribed Nexletol 180 mg 1 tablet daily and telmisartan 80 mg daily which was added last visit due to episodes of hypertension.    *She would like to make a note that she would eventually like to be on the lowest dose of medication as possible.  It was explained to the patient that we will reevaluate her blood pressures during her future follow-up appointments and may be able to decrease her telmisartan from 80 mg to 40 mg.  She is here today to also have a sleep study placed where we will evaluate for sleep apnea which may be contributing to elevated blood pressure readings.  PMH: She states that she has been under a lot of stress recently due to the recent passing of her mother and problems with her children.   BLOOD PRESSURE: -BP is better controlled on today's exam.  SLEEP APNEA ASSESSMENT: She also gives a history of occasional fatigue during the day, and snoring at nighttime. She  may have had an isolated apneic episode. The patient will schedule a sleep study to r/o sleep apnea which may be contributing to their HTN. A detailed explanation of the use of the machinery, the risks and benefits of sleep apnea were explained to the patient.  She  has a good understanding of utilization of the machinery and further recommendation will be made after the results of sleep study are available for review  Sleep apnea is associated with adverse clinical consequences which an affect most organ systems. Cardiovascular disease risk includes arrhythmias, atrial fibrillation, hypertension, coronary artery disease, and stroke. Metabolic disorders include diabetes type 2, non-alcoholic fatty liver disease. Mood disorder especially depression; and cognitive decline especially in the elderly. Associations with chronic reflux/Mckeon's esophagus some but not all inclusive. Reasons include arousal consistent with hypopnea; respiratory events most prominent in REM sleep or supine position; therefore sleep staging and body position are important for accurate diagnosis and estimation of AHI.   BLOOD WORK: -New blood work was done 03/06/2023 which demonstrated normal lipid profile. -Blood work done November 22, 2022 demonstrated total cholesterol of 212.  .  Rosuvastatin 20 mg daily was added back at this time. -New blood work was done 09/06/2022 which demonstrated total cholesterol 165 and LDL 88. -Blood work done June 7, 2022 demonstrated cholesterol of 145, triglycerides of 206, HDL 50 and LDL of 64. -Blood work was done 4/29/2021 which demonstrated cholesterol of 191 and LDL of 108. -She remains on Rosuvastatin 20mg PO DAILY.  TESTING/REPORTS: -Sleep study placed 4/17/2023 and results are pending.  -EKG done 03/06/2023 which demonstrated regular sinus rhythm with nonspecific ST-T wave changes BPM of 64.  -EKG done 09/06/2022 which demonstrated regular sinus rhythm with nonspecific ST-T wave changes BPM of 57.  -Echocardiogram done 9/6/2022 which demonstrated mild mitral annular calcification, minimal to mild mitral regurgitation, mild tricuspid regurgitation with physiologic pulmonic regurgitation and borderline pulmonary pressures with normal left ventricular systolic function ejection fraction 65%.  -EKG done 07/05/2022 which demonstrated regular sinus rhythm with nonspecific ST-T wave changes BPM of 70.  -24-hour blood pressure monitor done June 14, 2022 demonstrated stage I hypertension in the night and stage II hypertension during the day.  -The patient had a normal exercise stress test June 7, 2022. The patient had a normal blood pressure response to exercise.  -EKG done May 03, 2021 which demonstrated regular sinus rhythm with nonspecific ST-T wave changes, BPM of 75.  -The patient had an echocardiogram done on 2/22/2021 demonstrated mild mitral tricuspid and pulmonic regurgitation with normal left ventricular systolic function.  -EF on echo reported to be 65%.  -The patient had a normal exercise stress test done on February 22, 2021.  PLAN: -Sleep study ordered to evaluate for sleep apnea.  We will discuss the results of the sleep study once received. -The patient is clinically stable from a cardiac standpoint on today's exam. -She will continue with her her current medications and will contact the office if she is having any complaints between now and their next follow up appointment. -3-month follow-up.  I have discussed the plan of care with Ms. ROBERTO MILLAN. She is compliant with all of her medications.  The patient understands that aerobic exercises must be increased to minutes 4 times/week and a detailed discussion of lifestyle modification was done today.  The patient has a good understanding of the diagnosis, treatment plan and lifestyle modification.  She will contact me at the office for any questions with their care or any changes in their health status.  The plan of care was discussed with supervising physician, Dr. Archer while present in the office at the time of the visit.   Caleb MEHTA

## 2023-08-08 NOTE — DISCUSSION/SUMMARY
[FreeTextEntry1] : This is a 52-year-old female with past medical history significant for hypercholesterolemia, who comes in for cardiac follow-up evaluation.  She denies chest pain, shortness of breath, dizziness or syncope.    She does get occasional dyspnea on exertion.  She has no history of rheumatic fever.  She does not drink excessive caffeine or alcohol. Her cardiac risk factors include hypercholesterolemia, and positive family history of heart disease (her father had coronary artery disease and ultimately had a CABG in his 70s, and mother had hypercholesterolemia).  She has 4 children including a set of boy twins, and 1 boy had a hypoplastic heart syndrome and had 3 surgeries at [facility]. Electrocardiogram done August 8, 2023 demonstrated normal sinus rhythm rate 66 bpm and is otherwise unremarkable. The patient stopped her Micardis therapy and her blood pressure today is adequate.  She will continue on her current diet and exercise program.  She is working with a . Lipid panel done August 7, 2023 demonstrated cholesterol 178, HDL 38, LDL calculated 83, triglycerides 351, and non-HDL cholesterol 140 mg/dL.  She is taking her Nexletol 180 mg on a fairly regular basis. She will continue on this medication.  She is frustrated by her inability to lose weight.  She feels that she is eating a healthy and prudent diet. I have recommended she work with our registered dietitian to discuss further options for her.  She was interested in GLP-1 agonist therapy, but I would not start that at this time until she works with a registered dietitian.  She would only be a candidate for Wegovy at this time. Home sleep study done April 18, 2023 was within normal limits. Echo Doppler examination done September 6, 2022 demonstrated minimal to mild mitral valve regurgitation, mild mitral annular calcification, mild tricuspid valve regurgitation, and physiologic pulmonic valve regurgitation with normal ejection fraction 65%  Blood work done September 6, 2022 demonstrated cholesterol 165, HDL of 43, triglycerides of 227, LDL direct of 88 mg/dL and non-HDL cholesterol 122 mg/dL with hemoglobin A1c of 5.4. I once again recommended she restart the Crestor therapy.  She does not wish to do so pending the results of today's blood test for lipid profile. The patient had a normal exercise stress test June 7, 2022. The patient had a normal blood pressure response to exercise. Echo Doppler examination done February 22, 2021 demonstrated minimal mitral valve regurgitation, mild tricuspid valve regurgitation physiologic pulmonic valve regurgitation with a normal ejection fraction of 64 to 65%.  The patient had normal exercise stress test February 22, 2021.  Electrocardiogram done January 19, 2021 demonstrates normal sinus rhythm rate 71 bpm is otherwise remarkable for nonspecific ST wave changes.  Blood work done March 21, 2022 with her primary care physician demonstrated cholesterol 187, HDL of 57, triglycerides 194, LDL calculated 99 mg/dL and non-HDL cholesterol 130 mg/dL with a hemoglobin A1c of 5.5.  She had normal liver function test. The patient been stable on Crestor 20 mg daily.  She reports that her blood pressure was elevated when she was at her gynecologist. She prefers to remain off medication and wishes lifestyle change. She will continue on her current diet and exercise program.  Blood work done October 25 off Crestor therapy demonstrated total cholesterol 263, direct LDL cholesterol 174, HDL 47, triglycerides 189, non-HDL cholesterol 216 mg/dL.  The patient had blood work done January 5, 2021 which demonstrated a potassium of 4.0, total cholesterol of 248, HDL of 46, triglycerides of 215, calculated LDL cholesterol 164 mg/dL, non-HDL cholesterol 202 mg/dL.  Hemoglobin A1c was 4.7.  The patient understands that aerobic exercises must be increased to 40 minutes 4 times per week. A detailed discussion of lifestyle modification was done today. The patient has a good understanding of the diagnosis, and treatment plan. Lifestyle modification was also outlined.

## 2023-08-15 ENCOUNTER — APPOINTMENT (OUTPATIENT)
Dept: SURGERY | Facility: CLINIC | Age: 53
End: 2023-08-15
Payer: COMMERCIAL

## 2023-08-15 VITALS
SYSTOLIC BLOOD PRESSURE: 131 MMHG | TEMPERATURE: 96.9 F | RESPIRATION RATE: 16 BRPM | BODY MASS INDEX: 29.51 KG/M2 | HEART RATE: 67 BPM | WEIGHT: 150.31 LBS | DIASTOLIC BLOOD PRESSURE: 84 MMHG | OXYGEN SATURATION: 100 % | HEIGHT: 60 IN

## 2023-08-15 PROCEDURE — 99204 OFFICE O/P NEW MOD 45 MIN: CPT

## 2023-08-15 NOTE — HISTORY OF PRESENT ILLNESS
[Improved Health] : Improved health [Improved Looks/Aesthetics] : Improved looks/aesthetics [Improve Mood] : Improved mood [Decrease Medications] : Decrease medications [] : No [Other: ___] : [unfilled] [Saxenda] : Saxenda [FreeTextEntry1] : Patient is a 52-year-old female with a 29 BMI that presents for initial obesity medicine consultation.  Patient has a current BMI of 29.4 and has been trying to lose weight unsuccessfully.  She is 52 years old and in the midst of menopause.  Patient has currently reportedly obtained a  and has been exercising.  We discussed several options for medication and patient would like to try a GLP-1 injection for obesity.  She is an appropriate candidate for either Saxenda or Wegovy and due to the shortages of Wegovy I will start patient on Saxenda based on approval and tolerance.

## 2023-08-15 NOTE — ASSESSMENT
[FreeTextEntry1] : In summary patient is a 52-year-old female in the midst of menopause with a 29.4 BMI that has been struggling to lose weight.  Would like to lose approximately 25 pounds.  Patient is a good candidate for Saxenda GLP-1 injection.  We reviewed this medication in the office and patient understands how to use the injection pen.  We also calculated patient's resting metabolic rate with our MR calculator online.  RMR [id number] marietta.  Patient would have to maintain a 950-calorie diet to lose approximately 4 pounds per month.  I have discussed initiating Saxenda therapy for Demetra.  All risks and benefits have been discussed with the patient. Risks include but are not limited to nausea, vomiting, constipation, diarrhea, and Gi upset. Contraindications include Pancreatitis, MENS type 2 and medullary thyroid cancer, and pregnancy. Pt. verbalize understanding and wishes to proceed with medical therapy. Instructions for use provided via office demonstration.   [] Order Saxenda and attempt authorization.  Patient has [employer] [] continue to work with  regularly.   [] See in 2 months for follow-up.  If patient has plan exclusion discussed possibly starting patient on topiramate.  Patient agrees with plan.

## 2023-09-05 LAB
ANION GAP SERPL CALC-SCNC: 11 MMOL/L
BUN SERPL-MCNC: 14 MG/DL
CALCIUM SERPL-MCNC: 9.8 MG/DL
CHLORIDE SERPL-SCNC: 103 MMOL/L
CHOLEST SERPL-MCNC: 178 MG/DL
CO2 SERPL-SCNC: 27 MMOL/L
CREAT SERPL-MCNC: 0.68 MG/DL
EGFR: 104 ML/MIN/1.73M2
ESTIMATED AVERAGE GLUCOSE: 105 MG/DL
GLUCOSE SERPL-MCNC: 102 MG/DL
HBA1C MFR BLD HPLC: 5.3 %
HDLC SERPL-MCNC: 38 MG/DL
LDLC SERPL CALC-MCNC: 83 MG/DL
LDLC SERPL DIRECT ASSAY-MCNC: 92 MG/DL
MAGNESIUM SERPL-MCNC: 2 MG/DL
NONHDLC SERPL-MCNC: 140 MG/DL
POTASSIUM SERPL-SCNC: 3.8 MMOL/L
SODIUM SERPL-SCNC: 140 MMOL/L
TRIGL SERPL-MCNC: 351 MG/DL
TSH SERPL-ACNC: 2 UIU/ML

## 2023-09-18 DIAGNOSIS — R39.9 UNSPECIFIED SYMPTOMS AND SIGNS INVOLVING THE GENITOURINARY SYSTEM: ICD-10-CM

## 2023-10-17 ENCOUNTER — APPOINTMENT (OUTPATIENT)
Dept: SURGERY | Facility: CLINIC | Age: 53
End: 2023-10-17

## 2023-11-09 RX ORDER — TELMISARTAN 80 MG/1
80 TABLET ORAL
Qty: 90 | Refills: 1 | Status: COMPLETED | COMMUNITY
Start: 2023-03-06 | End: 2023-11-09

## 2023-12-10 ENCOUNTER — EMERGENCY (EMERGENCY)
Facility: HOSPITAL | Age: 53
LOS: 1 days | Discharge: HOME | End: 2023-12-10
Attending: EMERGENCY MEDICINE | Admitting: EMERGENCY MEDICINE
Payer: COMMERCIAL

## 2023-12-10 VITALS
DIASTOLIC BLOOD PRESSURE: 56 MMHG | TEMPERATURE: 98 F | RESPIRATION RATE: 18 BRPM | OXYGEN SATURATION: 100 % | HEART RATE: 78 BPM | SYSTOLIC BLOOD PRESSURE: 133 MMHG

## 2023-12-10 DIAGNOSIS — Z98.890 OTHER SPECIFIED POSTPROCEDURAL STATES: Chronic | ICD-10-CM

## 2023-12-10 PROCEDURE — 73564 X-RAY EXAM KNEE 4 OR MORE: CPT | Mod: 26,RT

## 2023-12-10 PROCEDURE — 99283 EMERGENCY DEPT VISIT LOW MDM: CPT

## 2023-12-10 RX ORDER — IBUPROFEN 200 MG
400 TABLET ORAL ONCE
Refills: 0 | Status: COMPLETED | OUTPATIENT
Start: 2023-12-10 | End: 2023-12-10

## 2023-12-10 RX ORDER — ACETAMINOPHEN 500 MG
975 TABLET ORAL ONCE
Refills: 0 | Status: COMPLETED | OUTPATIENT
Start: 2023-12-10 | End: 2023-12-10

## 2023-12-10 RX ADMIN — Medication 400 MILLIGRAM(S): at 05:46

## 2023-12-10 RX ADMIN — Medication 400 MILLIGRAM(S): at 06:00

## 2023-12-10 RX ADMIN — Medication 975 MILLIGRAM(S): at 05:44

## 2023-12-10 RX ADMIN — Medication 975 MILLIGRAM(S): at 06:00

## 2023-12-10 NOTE — ED ADULT NURSE NOTE - NSFALLRISKINTERV_ED_ALL_ED
Communicate fall risk and risk factors to all staff, patient, and family/Provide visual cue: yellow wristband, yellow gown, etc/Reinforce activity limits and safety measures with patient and family/Call bell, personal items and telephone in reach/Instruct patient to call for assistance before getting out of bed/chair/stretcher/Non-slip footwear applied when patient is off stretcher/[place] to call system/Physically safe environment - no spills, clutter or unnecessary equipment/Purposeful Proactive Rounding/Room/bathroom lighting operational, light cord in reach Communicate fall risk and risk factors to all staff, patient, and family/Provide visual cue: yellow wristband, yellow gown, etc/Reinforce activity limits and safety measures with patient and family/Call bell, personal items and telephone in reach/Instruct patient to call for assistance before getting out of bed/chair/stretcher/Non-slip footwear applied when patient is off stretcher/[place] to call system/Physically safe environment - no spills, clutter or unnecessary equipment/Purposeful Proactive Rounding/Room/bathroom lighting operational, light cord in reach

## 2023-12-10 NOTE — ED ADULT NURSE NOTE - OBJECTIVE STATEMENT
Female pt presenting to ED with right foot pain sp fall, no painful distress noted, no swelling or deformity noted as well. Pt reports limited movement due to pain, no other complaints voiced.

## 2023-12-10 NOTE — ED PROVIDER NOTE - PHYSICAL EXAMINATION
Well-appearing, well nourished, awake, alert, oriented to person, place, time/situation and in no apparent distress.    Airway patent. Neck supple.    Eyes without scleral injection. No jaundice.    Strong pulse.    Respirations unlabored.    Abdomen soft, non-tender, no guarding.    MSK: R knee: TTP medial and inferior joint area. Not red/hot.    Alert and oriented, no gross motor or sensory deficits.    Skin: normal color for race, warm, dry and intact. No evidence of rash.    No SI/HI.

## 2023-12-10 NOTE — ED PROVIDER NOTE - CLINICAL SUMMARY MEDICAL DECISION MAKING FREE TEXT BOX
Use knee brace and crutches until you see the Orthopedic Surgeon.    Call [phone] and ask for an appointment at a convenient location.     Over-the-counter Tylenol 500 mg (1 or 2 every 6 hours) and/or Naproxen 500 mg (1 every 12 hours) for pain control.     Return if pain not controlled with oral medications.     Elevate leg when possible. Use knee brace and crutches until you see the Orthopedic Surgeon.    Call [phone] and ask for an appointment at a convenient location.     Over-the-counter Tylenol 500 mg (1 or 2 every 6 hours) and/or Naproxen 500 mg (1 every 12 hours) for pain control.     Return if pain not controlled with oral medications.     Elevate leg when possible. Radha: Likely R medial meniscal tear. Pain meds. X-ray. F//u Ortho.

## 2023-12-10 NOTE — ED ADULT NURSE NOTE - DRUG PRE-SCREENING (DAST -1)
Arrived from OR awakens easily and respirations unlabored. Dressings times three noted to right lateral hip intact. Ice applied. 1+ (fleeting) pedal pulse palpable with brisk capillary refill noted to right. Report received from Zbigniew Aguilar RN. No discomfort noted at this time. Statement Selected

## 2023-12-10 NOTE — ED PROVIDER NOTE - OBJECTIVE STATEMENT
Radha: 2 nights ago, pt. hyperextended R knee reaching for grandson crawling toward stairs. [place]/felt pop medial knee. Pain. Hasn't tried pain meds. Not red/hot. Using old crutch. Radha: 2 nights ago, pt. hyperextended R knee reaching for grandson crawling toward stairs. [place]/felt pop medial knee. Pain. Hasn't tried pain meds. Not red/hot. Using old crutch.

## 2023-12-10 NOTE — ED PROVIDER NOTE - NSFOLLOWUPINSTRUCTIONS_ED_ALL_ED_FT
Use knee brace and crutches until you see the Orthopedic Surgeon.    Call [phone] and ask for an appointment at a convenient location.     Over-the-counter Tylenol 500 mg (1 or 2 every 6 hours) and/or Naproxen 500 mg (1 every 12 hours) for pain control.     Return if pain not controlled with oral medications.     Elevate leg when possible. Use knee brace and crutches until you see the Orthopedic Surgeon.    Call [phone] and ask for an appointment at a convenient location.     Over-the-counter Tylenol 500 mg (1 or 2 every 6 hours) and/or Naproxen 500 mg (1 every 12 hours) for pain control.     Return if pain not controlled with oral medications.     Elevate leg when possible.

## 2023-12-10 NOTE — ED ADULT TRIAGE NOTE - CHIEF COMPLAINT QUOTE
Patient c/o right knee pain after tripping on step. Denies other trauma and head strike. Davey. HLEMILY.

## 2023-12-10 NOTE — ED PROVIDER NOTE - PATIENT PORTAL LINK FT
You can access the FollowMyHealth Patient Portal offered by [facility] by registering at the following website: [email or web address]. By joining [facility]’s FollowMyHealth portal, you will also be able to view your health information using other applications (apps) compatible with our system. You can access the FollowMyHealth Patient Portal offered by [facility] by registering at the following website: [email or web address]. By joining [facility]’s FollowMyHealth portal, you will also be able to view your health information using other applications (apps) compatible with our system.

## 2024-01-02 ENCOUNTER — LABORATORY RESULT (OUTPATIENT)
Age: 54
End: 2024-01-02

## 2024-01-02 ENCOUNTER — APPOINTMENT (OUTPATIENT)
Dept: CARDIOLOGY | Facility: CLINIC | Age: 54
End: 2024-01-02
Payer: COMMERCIAL

## 2024-01-02 VITALS
OXYGEN SATURATION: 99 % | HEART RATE: 66 BPM | RESPIRATION RATE: 16 BRPM | WEIGHT: 156 LBS | DIASTOLIC BLOOD PRESSURE: 88 MMHG | SYSTOLIC BLOOD PRESSURE: 138 MMHG | BODY MASS INDEX: 30.63 KG/M2 | HEIGHT: 60 IN | TEMPERATURE: 98.3 F

## 2024-01-02 PROCEDURE — 99214 OFFICE O/P EST MOD 30 MIN: CPT

## 2024-01-02 NOTE — DISCUSSION/SUMMARY
[FreeTextEntry1] : This is a 53-year-old female with past medical history significant for hypercholesterolemia, who comes in for cardiac follow-up evaluation.  She denies chest pain, shortness of breath, dizziness or syncope.    She does get occasional dyspnea on exertion.  She has no history of rheumatic fever.  She does not drink excessive caffeine or alcohol. Her cardiac risk factors include hypercholesterolemia, and positive family history of heart disease (her father had coronary artery disease and ultimately had a CABG in his 70s, and mother had hypercholesterolemia).  She has 4 children including a set of boy twins, and 1 boy had a hypoplastic heart syndrome and had 3 surgeries at [facility]. The patient's been under a lot of stress related to her son's illness who has been hospitalized at [facility] for the last month.  She reports that he had a pacemaker and is being cared for by the cardiology team. She has neglected herself and discontinued her Nexletol and telmisartan 40 mg treatment. She is willing to restart her Nexletol therapy.  She wishes to see if she could improve her blood pressure.  Lifestyle change now that she plans to refocus on her own health. Lipid panel done August 7, 2023 demonstrated a cholesterol of 178, HDL 38, triglycerides 351, LDL direct 92 mg/dL, non-HDL cholesterol 140, hemoglobin A1c of 5.3. She will have new blood work done today off medical therapy for lipids. I have once again recommend she work with a registered dietitian to improve her caloric intake, lipid profile, and carbohydrate intake. Electrocardiogram done August 8, 2023 demonstrated normal sinus rhythm rate 66 bpm and is otherwise unremarkable. The patient stopped her Micardis therapy and her blood pressure today is adequate.  She will continue on her current diet and exercise program.  She is working with a . Lipid panel done August 7, 2023 demonstrated cholesterol 178, HDL 38, LDL calculated 83, triglycerides 351, and non-HDL cholesterol 140 mg/dL.  She is taking her Nexletol 180 mg on a fairly regular basis. She will continue on this medication.  She is frustrated by her inability to lose weight.  She feels that she is eating a healthy and prudent diet. Home sleep study done April 18, 2023 was within normal limits. Echo Doppler examination done September 6, 2022 demonstrated minimal to mild mitral valve regurgitation, mild mitral annular calcification, mild tricuspid valve regurgitation, and physiologic pulmonic valve regurgitation with normal ejection fraction 65%  Blood work done September 6, 2022 demonstrated cholesterol 165, HDL of 43, triglycerides of 227, LDL direct of 88 mg/dL and non-HDL cholesterol 122 mg/dL with hemoglobin A1c of 5.4. I once again recommended she restart the Crestor therapy.  She does not wish to do so pending the results of today's blood test for lipid profile. The patient had a normal exercise stress test June 7, 2022. The patient had a normal blood pressure response to exercise. Echo Doppler examination done February 22, 2021 demonstrated minimal mitral valve regurgitation, mild tricuspid valve regurgitation physiologic pulmonic valve regurgitation with a normal ejection fraction of 64 to 65%.  The patient had normal exercise stress test February 22, 2021.  Electrocardiogram done January 19, 2021 demonstrates normal sinus rhythm rate 71 bpm is otherwise remarkable for nonspecific ST wave changes.  Blood work done March 21, 2022 with her primary care physician demonstrated cholesterol 187, HDL of 57, triglycerides 194, LDL calculated 99 mg/dL and non-HDL cholesterol 130 mg/dL with a hemoglobin A1c of 5.5.  She had normal liver function test. The patient been stable on Crestor 20 mg daily.. She will continue on her current diet and exercise program.  Blood work done October 25 off Crestor therapy demonstrated total cholesterol 263, direct LDL cholesterol 174, HDL 47, triglycerides 189, non-HDL cholesterol 216 mg/dL.  The patient had blood work done January 5, 2021 which demonstrated a potassium of 4.0, total cholesterol of 248, HDL of 46, triglycerides of 215, calculated LDL cholesterol 164 mg/dL, non-HDL cholesterol 202 mg/dL.  Hemoglobin A1c was 4.7.  The patient understands that aerobic exercises must be increased to 40 minutes 4 times per week. A detailed discussion of lifestyle modification was done today. The patient has a good understanding of the diagnosis, and treatment plan. Lifestyle modification was also outlined.

## 2024-01-12 NOTE — HISTORY OF PRESENT ILLNESS
[FreeTextEntry1] : Patient is a 53-year-old female with a 29 BMI that presents for initial obesity medicine consultation. Patient has a current BMI of 29.4 and has been trying to lose weight unsuccessfully. She is 52 years old and in the midst of menopause. Patient has currently reportedly obtained a  and has been exercising.  Patient was started on Saxenda at previous visit and presents for follow-up today.

## 2024-02-20 RX ORDER — BEMPEDOIC ACID 180 MG/1
180 TABLET, FILM COATED ORAL
Qty: 90 | Refills: 1 | Status: ACTIVE | COMMUNITY
Start: 2023-03-06

## 2024-02-25 ENCOUNTER — LABORATORY RESULT (OUTPATIENT)
Age: 54
End: 2024-02-25

## 2024-02-26 ENCOUNTER — APPOINTMENT (OUTPATIENT)
Dept: CARDIOLOGY | Facility: CLINIC | Age: 54
End: 2024-02-26
Payer: COMMERCIAL

## 2024-02-26 VITALS
DIASTOLIC BLOOD PRESSURE: 84 MMHG | OXYGEN SATURATION: 98 % | BODY MASS INDEX: 28.16 KG/M2 | HEIGHT: 62 IN | RESPIRATION RATE: 16 BRPM | HEART RATE: 73 BPM | SYSTOLIC BLOOD PRESSURE: 131 MMHG | WEIGHT: 153 LBS | TEMPERATURE: 97.1 F

## 2024-02-26 DIAGNOSIS — I34.0 NONRHEUMATIC MITRAL (VALVE) INSUFFICIENCY: ICD-10-CM

## 2024-02-26 DIAGNOSIS — R03.0 ELEVATED BLOOD-PRESSURE READING, W/OUT DIAGNOSIS OF HYPERTENSION: ICD-10-CM

## 2024-02-26 DIAGNOSIS — E78.00 PURE HYPERCHOLESTEROLEMIA, UNSPECIFIED: ICD-10-CM

## 2024-02-26 DIAGNOSIS — E66.9 OBESITY, UNSPECIFIED: ICD-10-CM

## 2024-02-26 DIAGNOSIS — I07.1 RHEUMATIC TRICUSPID INSUFFICIENCY: ICD-10-CM

## 2024-02-26 PROCEDURE — 99214 OFFICE O/P EST MOD 30 MIN: CPT

## 2024-02-26 PROCEDURE — G2211 COMPLEX E/M VISIT ADD ON: CPT | Mod: NC,1L

## 2024-02-26 NOTE — PHYSICAL EXAM
[Well Developed] : well developed [Well Nourished] : well nourished [No Acute Distress] : no acute distress [Normal Venous Pressure] : normal venous pressure [No Carotid Bruit] : no carotid bruit [Normal S1, S2] : normal S1, S2 [No Rub] : no rub [I] : a grade 1 [Good Air Entry] : good air entry [Clear Lung Fields] : clear lung fields [No Respiratory Distress] : no respiratory distress  [Soft] : abdomen soft [Non Tender] : non-tender [No Masses/organomegaly] : no masses/organomegaly [Normal Gait] : normal gait [Normal Bowel Sounds] : normal bowel sounds [No Cyanosis] : no cyanosis [No Edema] : no edema [No Clubbing] : no clubbing [No Varicosities] : no varicosities [No Skin Lesions] : no skin lesions [No Rash] : no rash [Moves all extremities] : moves all extremities [No Focal Deficits] : no focal deficits [Normal Speech] : normal speech [Alert and Oriented] : alert and oriented [Normal memory] : normal memory [Normal Appearance] : normal appearance [General Appearance - Well Developed] : well developed [General Appearance - Well Nourished] : well nourished [Well Groomed] : well groomed [No Deformities] : no deformities [General Appearance - In No Acute Distress] : no acute distress [Normal Conjunctiva] : the conjunctiva exhibited no abnormalities [Normal Oral Mucosa] : normal oral mucosa [Normal Jugular Venous A Waves Present] : normal jugular venous A waves present [Normal Jugular Venous V Waves Present] : normal jugular venous V waves present [No Jugular Venous Marinelli A Waves] : no jugular venous marinelli A waves [Respiration, Rhythm And Depth] : normal respiratory rhythm and effort [Auscultation Breath Sounds / Voice Sounds] : lungs were clear to auscultation bilaterally [Exaggerated Use Of Accessory Muscles For Inspiration] : no accessory muscle use [Bowel Sounds] : normal bowel sounds [Abdomen Soft] : soft [Abnormal Walk] : normal gait [Nail Clubbing] : no clubbing of the fingernails [Gait - Sufficient For Exercise Testing] : the gait was sufficient for exercise testing [Skin Color & Pigmentation] : normal skin color and pigmentation [Cyanosis, Localized] : no localized cyanosis [Oriented To Time, Place, And Person] : oriented to person, place, and time [Skin Turgor] : normal skin turgor [] : no rash [Affect] : the affect was normal [Mood] : the mood was normal [5th Left ICS - MCL] : palpated at the 5th LICS in the midclavicular line [No Anxiety] : not feeling anxious [No Precordial Heave] : no precordial heave was noted [Normal] : normal [Normal Rate] : normal [Rhythm Regular] : regular [Normal S2] : normal S2 [Normal S1] : normal S1 [No Gallop] : no gallop heard [II] : a grade 2 [No Abnormalities] : the abdominal aorta was not enlarged and no bruit was heard [2+] : left 2+ [No Pitting Edema] : no pitting edema present [S3] : no S3 [S4] : no S4 [Click] : no click [Right Carotid Bruit] : no bruit heard over the right carotid [Pericardial Rub] : no pericardial rub [Left Carotid Bruit] : no bruit heard over the left carotid [Left Femoral Bruit] : no bruit heard over the left femoral artery [Right Femoral Bruit] : no bruit heard over the right femoral artery

## 2024-02-26 NOTE — DISCUSSION/SUMMARY
[FreeTextEntry1] : This is a 53-year-old female with past medical history significant for hypercholesterolemia, who comes in for cardiac follow-up evaluation.  She denies chest pain, shortness of breath, dizziness or syncope.    She does get occasional dyspnea on exertion.  She has no history of rheumatic fever.  She does not drink excessive caffeine or alcohol. Her cardiac risk factors include hypercholesterolemia, and positive family history of heart disease (her father had coronary artery disease and ultimately had a CABG in his 70s, and mother had hypercholesterolemia).  She has 4 children including a set of boy twins, and 1 boy had a hypoplastic heart syndrome and had 3 surgeries at [facility] [place]. The patient's been under a lot of stress related to her son's illness who has been hospitalized at [facility] for the last month and is currently receiving radiation therapy at [facility] [place] in [place]. She reports that he had a pacemaker and is being cared for by the cardiology team. She has neglected herself and discontinued her Nexletol and telmisartan 40 mg treatment. Lipid panel done January 2, 2024 demonstrated cholesterol 251, HDL 47, triglycerides 158, LDL direct 178 mg/dL and non-HDL cholesterol 204 mg/dL. The patient still does not wish to go on any more lipid-lowering therapy.  I discussed with her the role for PCSK9 injectable therapy or Inclisiran therapy but she is not interested in pursuing that type of therapy at this time. She may benefit from a coronary artery calcium score to help her in her decision making. She is willing to restart her Nexletol therapy.  She has been frustrated by her inability to lose weight.  She is trying to maintain a healthy diet.  Her exercise been limited due to her childcare responsibilities.  She wishes to see if she could improve her lipid panel and blood pressure with lifestyle change now that she plans to refocus on her own health. Lipid panel done August 7, 2023 demonstrated a cholesterol of 178, HDL 38, triglycerides 351, LDL direct 92 mg/dL, non-HDL cholesterol 140, hemoglobin A1c of 5.3. She will have new blood work done today off medical therapy for lipids. I have once again recommend she work with a registered dietitian to improve her caloric intake, lipid profile, and carbohydrate intake. Electrocardiogram done August 8, 2023 demonstrated normal sinus rhythm rate 66 bpm and is otherwise unremarkable. The patient stopped her Micardis therapy and her blood pressure today is adequate.  She will continue on her current diet and exercise program.  She is working with a . Lipid panel done August 7, 2023 demonstrated cholesterol 178, HDL 38, LDL calculated 83, triglycerides 351, and non-HDL cholesterol 140 mg/dL.  She is taking her Nexletol 180 mg on a fairly regular basis. She will continue on this medication.  She is frustrated by her inability to lose weight.  She feels that she is eating a healthy and prudent diet. Home sleep study done April 18, 2023 was within normal limits. Echo Doppler examination done September 6, 2022 demonstrated minimal to mild mitral valve regurgitation, mild mitral annular calcification, mild tricuspid valve regurgitation, and physiologic pulmonic valve regurgitation with normal ejection fraction 65%  Blood work done September 6, 2022 demonstrated cholesterol 165, HDL of 43, triglycerides of 227, LDL direct of 88 mg/dL and non-HDL cholesterol 122 mg/dL with hemoglobin A1c of 5.4. I once again recommended she restart the Crestor therapy.  She does not wish to do so pending the results of today's blood test for lipid profile. The patient had a normal exercise stress test June 7, 2022. The patient had a normal blood pressure response to exercise. Echo Doppler examination done February 22, 2021 demonstrated minimal mitral valve regurgitation, mild tricuspid valve regurgitation physiologic pulmonic valve regurgitation with a normal ejection fraction of 64 to 65%.  The patient had normal exercise stress test February 22, 2021.  Electrocardiogram done January 19, 2021 demonstrates normal sinus rhythm rate 71 bpm is otherwise remarkable for nonspecific ST wave changes.  Blood work done March 21, 2022 with her primary care physician demonstrated cholesterol 187, HDL of 57, triglycerides 194, LDL calculated 99 mg/dL and non-HDL cholesterol 130 mg/dL with a hemoglobin A1c of 5.5.  She had normal liver function test. The patient been stable on Crestor 20 mg daily.. She will continue on her current diet and exercise program.  Blood work done October 25 off Crestor therapy demonstrated total cholesterol 263, direct LDL cholesterol 174, HDL 47, triglycerides 189, non-HDL cholesterol 216 mg/dL.  The patient had blood work done January 5, 2021 which demonstrated a potassium of 4.0, total cholesterol of 248, HDL of 46, triglycerides of 215, calculated LDL cholesterol 164 mg/dL, non-HDL cholesterol 202 mg/dL.  Hemoglobin A1c was 4.7.  The patient understands that aerobic exercises must be increased to 40 minutes 4 times per week. A detailed discussion of lifestyle modification was done today. The patient has a good understanding of the diagnosis, and treatment plan. Lifestyle modification was also outlined.

## 2024-02-27 RX ORDER — NITROFURANTOIN (MONOHYDRATE/MACROCRYSTALS) 25; 75 MG/1; MG/1
100 CAPSULE ORAL
Qty: 14 | Refills: 0 | Status: DISCONTINUED | COMMUNITY
Start: 2023-09-18 | End: 2024-02-27

## 2024-02-27 RX ORDER — LIRAGLUTIDE 6 MG/ML
18 INJECTION, SOLUTION SUBCUTANEOUS
Qty: 15 | Refills: 1 | Status: DISCONTINUED | COMMUNITY
Start: 2023-08-15 | End: 2024-02-27

## 2024-02-27 RX ORDER — PEN NEEDLE, DIABETIC 32 GX 1/4"
32G X 6 MM NEEDLE, DISPOSABLE MISCELLANEOUS
Qty: 100 | Refills: 0 | Status: DISCONTINUED | COMMUNITY
Start: 2023-08-15 | End: 2024-02-27

## 2024-03-01 ENCOUNTER — TRANSCRIPTION ENCOUNTER (OUTPATIENT)
Age: 54
End: 2024-03-01

## 2024-04-17 ENCOUNTER — OUTPATIENT (OUTPATIENT)
Dept: OUTPATIENT SERVICES | Facility: HOSPITAL | Age: 54
LOS: 1 days | End: 2024-04-17
Payer: COMMERCIAL

## 2024-04-17 ENCOUNTER — APPOINTMENT (OUTPATIENT)
Dept: ULTRASOUND IMAGING | Facility: CLINIC | Age: 54
End: 2024-04-17
Payer: COMMERCIAL

## 2024-04-17 DIAGNOSIS — Z98.890 OTHER SPECIFIED POSTPROCEDURAL STATES: Chronic | ICD-10-CM

## 2024-04-17 DIAGNOSIS — Z83.719 FAMILY HISTORY OF COLON POLYPS, UNSPECIFIED: ICD-10-CM

## 2024-04-17 PROCEDURE — 76700 US EXAM ABDOM COMPLETE: CPT | Mod: 26

## 2024-04-17 PROCEDURE — 76700 US EXAM ABDOM COMPLETE: CPT

## 2024-06-19 ENCOUNTER — APPOINTMENT (OUTPATIENT)
Dept: CARDIOLOGY | Facility: CLINIC | Age: 54
End: 2024-06-19

## 2025-02-14 NOTE — H&P PST ADULT - HISTORY OF PRESENT ILLNESS
Needs appointment.  Hasn't been seen in over 2 years.  
Pt is a 50y.o. WF  with LMP 2021. Pt states she has been experiencing very heavy periods for several years. Workup including hysterosonogram revealed a uterine polyp. She is scheduled for a D&C, diagnostic hysteroscopy, polypectomy and possible operative hysteroscopy with Dr. Dill on 2021.    Covid swab testing is scheduled for 21 at [facility].  Pt had covid at the end of 2021. She was hospitalized at [facility] for 5 days and was treated with Remdesivir. Pt states she never required a ventilator and denies any long term effects. Pt is going for medical clearance pre-op